# Patient Record
Sex: MALE | Race: WHITE | HISPANIC OR LATINO | Employment: OTHER | URBAN - METROPOLITAN AREA
[De-identification: names, ages, dates, MRNs, and addresses within clinical notes are randomized per-mention and may not be internally consistent; named-entity substitution may affect disease eponyms.]

---

## 2019-03-07 DIAGNOSIS — G95.9 CERVICAL MYELOPATHY WITH CERVICAL RADICULOPATHY: Primary | ICD-10-CM

## 2019-03-07 DIAGNOSIS — M54.12 CERVICAL MYELOPATHY WITH CERVICAL RADICULOPATHY: Primary | ICD-10-CM

## 2019-03-19 ENCOUNTER — CLINICAL SUPPORT (OUTPATIENT)
Dept: REHABILITATION | Facility: HOSPITAL | Age: 64
End: 2019-03-19
Payer: MEDICAID

## 2019-03-19 DIAGNOSIS — R29.898 DECREASED STRENGTH OF UPPER EXTREMITY: ICD-10-CM

## 2019-03-19 DIAGNOSIS — R29.898 DECREASED ROM OF NECK: ICD-10-CM

## 2019-03-19 DIAGNOSIS — R29.3 POOR POSTURE: ICD-10-CM

## 2019-03-19 PROCEDURE — 97162 PT EVAL MOD COMPLEX 30 MIN: CPT | Mod: PN

## 2019-03-19 NOTE — PLAN OF CARE
[facility]  Physical Therapy Initial Evaluation    Name: Rancho Contreras  Clinic Number: [id number]    Therapy Diagnosis:   Encounter Diagnoses   Name Primary?    Decreased ROM of neck     Decreased strength of upper extremity     Poor posture      Physician: Dong Ray    Physician Orders: PT Eval and Treat   Medical Diagnosis from Referral: M47.12 (ICD-10-CM) - Cervical myelopathy with cervical radiculopathy  Evaluation Date: 3/19/2019  Authorization Period Expiration: 3/31/19  Plan of Care Expiration: 5/17/19  Visit # / Visits authorized: 1/ 4    Time In: 3:10 pm  Time Out: 3:53 pm   Total Billable Time: 43 minutes    Precautions: Standard, Diabetes and HTN    Subjective   Date of onset: chronic; DOS 11/2/18  History of current condition - Rancho reports: that he had surgery where they went through the front of his neck to fix discs in upper back on November 2, 2018. Pt stated that that he has not had physical therapy since his surgery. Pt stated that he did do physical therapy prior to his surgery. Pt stated that he wore neck collar for a couple of months after his surgery, and has not worn neck collar for 1 1/2 months. Pt stated that he has not seen MD since December 2018 and that MD told him he needed to go to therapy.  Pt stated that he still experiences pain and weakness in (L) UE. Pt reported that he has difficulty holding/reaching objects in (L) UE due to weakness. Pt denies radiating pain into (R) UE. Pt denies numbness/tingling into (B) UE. Pt stated that he still does experience pain in his neck. Pt stated that he is (R) handed. Pt stated that he has been occasionally lifting his granddaughters who weigh approximately 40 lbs.      Medical History:   Past Medical History:   Diagnosis Date    Diabetes mellitus type II     HEARING LOSS     Hyperlipidemia     Hypertension        Surgical History:   Rancho Contreras  has a past surgical history that  includes Tonsillectomy and Adenoidectomy.    Medications:   Rancho has a current medication list which includes the following prescription(s): glyburide, lisinopril, metformin, and pravastatin.    Allergies:   Review of patient's allergies indicates:  No Known Allergies     Imaging: none in pt imaging section in pt chart review    Surgery(from MD surgery report): C3-4,C4-5, C5-6, C6-7 anterior cervical decompression and fusion using the [employer] XTEND anterior plating system with [place] PEEK interbody cages. [place] of left anterior superior iliac crest autograph through a separate incision.     Prior Therapy: yes - prior to surgery  Social History: Pt lives with their spouse  Occupation: Pt stated that he is on disability right now. 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   Prior Level of Function: independent  Current Level of Function: difficulty performing reaching tasks with (L) UE and uses (R) UE to help raise (L) UE    Pain:  Current 6/10, worst 6/10, best 6/10   Location: right neck radiating into (L) UE   Description: Aching and Sharp  Aggravating Factors: pt denies particular aggravating factors  Easing Factors: Tylenol    Pts goals: complete movement of the neck and fully use my left arm    Objective     Cervical AROM: Pain/Dysfunction with Movement:   Flexion 40 degrees C/o increased pain in neck   Extension 35 degrees    Right side bending 20 degrees C/o increased pain in (R) neck/UT region   Left side bending 20 degrees C/o pulling in (R) UT region   Right rotation 50 degrees C/o pain in (R) neck region   Left rotation 60 degrees Report of stretching in (R) neck region     Shoulder Right   Left  Pain/Dysfunction with Movement    AROM MMT AROM PROM MMT    flexion 160  5/5 158  NT 4+/5    abduction 160  5/5 115 160 3-/5    Internal rotation 80 5/5 80 NT 4/5    ER at 90° abd 90 NT 85 NT 4+/5 Report of pulling around supraspinatus with AROM   ER at 0° abd NT 5/5 NT NT NT       Elbow Right  Left  Pain/Dysfunction with Movement    AROM MMT AROM MMT    flexion WFL 5/5 WFL 4-/5    extension WFL 5/5 WFL 4/5       (#)    Right 53.3   Left 30     (B) UE grossly intact to light touch sensation  Pt c/o tenderness to palpation along (B) cervical paraspinal muscles, (B) UT, C2- C7 spinous processes, (R) thoracic paraspinals      CMS Impairment/Limitation/Restriction for FOTO neck Survey    Therapist reviewed FOTO scores for Rancho Contreras on 3/19/2019.   FOTO documents entered into [employer] - see Media section.    Limitation Score: 68%         TREATMENT       Home Exercises and Patient Education Provided     Education provided:   - Educated pt not to lift heavy objects and to avoid lifting his granddaughters. Pt verbalized understanding        Assessment   Rancho is a 63 y.o. male referred to outpatient Physical Therapy with a medical diagnosis of cervical myelopathy with cervical radiculopathy . Pt presents with decreased/painful cervical AROM, decreased (L) UE strength, decreased (L) shoulder AROM, poor posture, and decreased functional mobility. Pt will benefit from skilled PT to address the limitations listed above in order to return pt to his highest level of function with decreased pain and limitation.     Pt prognosis is Good.   Pt will benefit from skilled outpatient Physical Therapy to address the deficits stated above and in the chart below, provide pt/family education, and to maximize pt's level of independence.     Plan of care discussed with patient: Yes  Pt's spiritual, cultural and educational needs considered and patient is agreeable to the plan of care and goals as stated below:     Anticipated Barriers for therapy: comorbidities    Medical Necessity is demonstrated by the following  History  Co-morbidities and personal factors that may impact the plan of care Co-morbidities:   diabetes and HTN    Personal Factors:   no deficits     moderate   Examination  Body Structures  and Functions, activity limitations and participation restrictions that may impact the plan of care Body Regions:   neck  upper extremities    Body Systems:    ROM  strength    Participation Restrictions:   Not currently able to work    Activity limitations:   Learning and applying knowledge  no deficits    General Tasks and Commands  no deficits    Communication  no deficits    Mobility  lifting and carrying objects  fine hand use (grasping/picking up)  driving (bike, car, motorcycle)    Self care  no deficits    Domestic Life  doing house work (cleaning house, washing dishes, laundry)    Interactions/Relationships  no deficits    Life Areas  no deficits    Community and Social Life  no deficits         high   Clinical Presentation evolving clinical presentation with changing clinical characteristics moderate   Decision Making/ Complexity Score: moderate     Goals:  Short Term Goals (4 Weeks):  1. Pt will be independent with HEP to supplement PT in improving pain free cervical mobility  2. Pt will improve cervical AROM 10 deg in all planes to improve cervical mobility.  3. Pt will improve impaired UE MMTs by 1/2 grade in all planes to improve strength for functional tasks.  4. Pt will demonstrate improved sitting posture to decrease pain experienced in neck.    Long Term Goals (8 Weeks):   1. Pt will improve FOTO to </=50% limitation to improve perceived limitation with changing and maintaining mobility.  2. Pt will improve cervical AROM to WFL in all planes to improve cervical mobility.  3. Pt will improve impaired UE MMTs 1 grade in all planes to improve strength for functional tasks.  4. Pt will report pain </= 2/10 at worst to promote return to PLOF.  5. Pt will report pain </= 2/10 with cervical AROM in all planes to promote functional QOL.      Plan   Plan of care Certification: 3/19/2019 to 5/17/19.    Outpatient Physical Therapy 2 times weekly for 8 weeks to include the following interventions: Manual  Therapy, Moist Heat/ Ice, Neuromuscular Re-ed, Patient Education, Self Care, Therapeutic Activites, Therapeutic Exercise and ASTYM, FDN, and modalities prn.     Chasidy Arias, PT

## 2019-03-21 ENCOUNTER — CLINICAL SUPPORT (OUTPATIENT)
Dept: REHABILITATION | Facility: HOSPITAL | Age: 64
End: 2019-03-21
Payer: MEDICAID

## 2019-03-21 DIAGNOSIS — R29.3 POOR POSTURE: ICD-10-CM

## 2019-03-21 DIAGNOSIS — R29.898 DECREASED ROM OF NECK: ICD-10-CM

## 2019-03-21 DIAGNOSIS — R29.898 DECREASED STRENGTH OF UPPER EXTREMITY: ICD-10-CM

## 2019-03-21 PROCEDURE — 97110 THERAPEUTIC EXERCISES: CPT | Mod: PN

## 2019-03-21 NOTE — PROGRESS NOTES
"  Physical Therapy Daily Treatment Note     Name: Rancho Contreras  Clinic Number: [id number]    Therapy Diagnosis: No diagnosis found.  Physician: Dong Ray    Visit Date: 3/21/2019  Physician Orders: PT Eval and Treat   Medical Diagnosis from Referral: M47.12 (ICD-10-CM) - Cervical myelopathy with cervical radiculopathy  Evaluation Date: 3/19/2019  Authorization Period Expiration: 3/31/19  Plan of Care Expiration: 5/17/19  Visit # / Visits authorized: 2/ 4  FOTO: 2/5    Time In: 1300  Time Out: 1340  Total Billable Time: 40 minutes TEx3    Precautions: Standard, Diabetes and HTN    Subjective     Pt reports: His neck hurts and it is stiff, weakness left arm.  "Pain is always there"  He was not yet given HEP compliant with home exercise program.  Response to previous treatment: no adverse effects  Functional change: none    Pain: 6/10  Location: bilateral cervical area, left greater than right      Objective     Rancho received the following manual therapy techniques: Myofacial release and Soft tissue Mobilization were applied to the cervical spine and B upper traps x 15 minutes, including:    STM to B cervical paraspinals with elongation  Gentle sub occipital release  STM/MFR to B Upper Traps with manual stretching    Rancho received therapeutic exercises to develop strength, endurance, ROM, flexibility and posture for 25 minutes including:    Cervical retraction:   1x10  Cervical rotation: 1x10 B  Wand flexion  1# 2x10  Serratus push  1# wand 2x10  SL shldr abduction 1x10 B  SL shldr ER  1x10 B  Home Exercises Provided and Patient Education Provided     Education provided:   - Written initial HEP       Written Home Exercises Provided: yes.  Exercises were reviewed and Rancho was able to demonstrate them prior to the end of the session.  Rancho demonstrated good  understanding of the education provided.     See EMR under Media for exercises provided 3/21/2019.      Assessment     Patient required " "multiple verbal cues and tactile facilitation to perform therex with correct technique.  Difficulty with attempt at chin tucks, reports pain/limitations due to "plate in my neck" Held due to same.  Patient reports "looser" after treatment, but continued to rate pain at "6/10"    Rancho is progressing well towards his goals.   Pt prognosis is Good.     Pt will continue to benefit from skilled outpatient physical therapy to address the deficits listed in the problem list box on initial evaluation, provide pt/family education and to maximize pt's level of independence in the home and community environment.     Pt's spiritual, cultural and educational needs considered and pt agreeable to plan of care and goals.    Anticipated barriers to physical therapy: comorbidities       Goals:  Short Term Goals (4 Weeks):  1. Pt will be independent with HEP to supplement PT in improving pain free cervical mobility  2. Pt will improve cervical AROM 10 deg in all planes to improve cervical mobility.  3. Pt will improve impaired UE MMTs by 1/2 grade in all planes to improve strength for functional tasks.  4. Pt will demonstrate improved sitting posture to decrease pain experienced in neck.     Long Term Goals (8 Weeks):   1. Pt will improve FOTO to </=50% limitation to improve perceived limitation with changing and maintaining mobility.  2. Pt will improve cervical AROM to WFL in all planes to improve cervical mobility.  3. Pt will improve impaired UE MMTs 1 grade in all planes to improve strength for functional tasks.  4. Pt will report pain </= 2/10 at worst to promote return to PLOF.  5. Pt will report pain </= 2/10 with cervical AROM in all planes to promote functional QOL.      Plan     Continue PT per POC, progress as able.    Frida Kimbrough, PTA   "

## 2019-03-26 ENCOUNTER — CLINICAL SUPPORT (OUTPATIENT)
Dept: REHABILITATION | Facility: HOSPITAL | Age: 64
End: 2019-03-26
Payer: MEDICAID

## 2019-03-26 DIAGNOSIS — R29.3 POOR POSTURE: ICD-10-CM

## 2019-03-26 DIAGNOSIS — R29.898 DECREASED STRENGTH OF UPPER EXTREMITY: ICD-10-CM

## 2019-03-26 DIAGNOSIS — R29.898 DECREASED ROM OF NECK: ICD-10-CM

## 2019-03-26 PROCEDURE — 97110 THERAPEUTIC EXERCISES: CPT | Mod: PN

## 2019-03-26 NOTE — PROGRESS NOTES
"  Physical Therapy Daily Treatment Note     Name: Rancho Contreras  Clinic Number: [id number]    Therapy Diagnosis:   Encounter Diagnoses   Name Primary?    Decreased ROM of neck     Decreased strength of upper extremity     Poor posture      Physician: Dong Ray    Visit Date: 3/26/2019  Physician Orders: PT Eval and Treat   Medical Diagnosis from Referral: M47.12 (ICD-10-CM) - Cervical myelopathy with cervical radiculopathy  Evaluation Date: 3/19/2019  Authorization Period Expiration: 3/31/19  Plan of Care Expiration: 5/17/19  Visit # / Visits authorized: 3/ 4  FOTO: 3/5    Time In: 1400  Time Out: 1500  Total Billable Time:  minutes 60 TE    Precautions: Standard, Diabetes and HTN    Subjective     Pt reports: His neck hurts and it is stiff, weakness left arm.  "Pain is always there"  He was not yet given HEP compliant with home exercise program.  Response to previous treatment: no adverse effects  Functional change: none    Pain: 5/10  Location: bilateral cervical area, left greater than right      Objective     Rancho received the following manual therapy techniques: Myofacial release and Soft tissue Mobilization were applied to the cervical spine and B upper traps x 30 minutes, including:    STM to B cervical paraspinals with elongation  Gentle sub occipital release  STM/MFR to B Upper Traps with manual stretching  Deep tissue to B Upper trap/levator B  SCM CFM to insertion B  SCM muscle belly pull  [place] spring stretch      Rancho received therapeutic exercises to develop strength, endurance, ROM, flexibility and posture for 30 minutes including:    Cervical retraction:   1x10  Cervical rotation: 1x10 B  Wand flexion  1# 2x10  Serratus push  1# wand 2x10  SL shldr horiz abd       2x10 B w/scapular retraction/depression  SL shldr abduction 1x15 B w/scapular retraction/depression  SL shldr ER  2x10 B w/scap retraction/depression    Standing:   Elbow flexion               1x10  Rows             " "              2 x 10 YTB B  Elbow ext                     2 x 10 RTB  Home Exercises Provided and Patient Education Provided     Education provided:   - Written initial HEP       Written Home Exercises Provided: yes.  Exercises were reviewed and Rancho was able to demonstrate them prior to the end of the session.  Rancho demonstrated good  understanding of the education provided.     See EMR under Media for exercises provided 3/21/2019.      Assessment     Patient required multiple verbal cues and tactile facilitation to perform therex with correct technique.  Difficulty with attempt at chin tucks but tolerated better today, reports pain/limitations due to "plate in my neck". SL TE required extra time due to difficulty of task.   Rancho is progressing well towards his goals.   Pt prognosis is Good.     Pt will continue to benefit from skilled outpatient physical therapy to address the deficits listed in the problem list box on initial evaluation, provide pt/family education and to maximize pt's level of independence in the home and community environment.     Pt's spiritual, cultural and educational needs considered and pt agreeable to plan of care and goals.    Anticipated barriers to physical therapy: comorbidities       Goals:  Short Term Goals (4 Weeks):  1. Pt will be independent with HEP to supplement PT in improving pain free cervical mobility  2. Pt will improve cervical AROM 10 deg in all planes to improve cervical mobility.  3. Pt will improve impaired UE MMTs by 1/2 grade in all planes to improve strength for functional tasks.  4. Pt will demonstrate improved sitting posture to decrease pain experienced in neck.     Long Term Goals (8 Weeks):   1. Pt will improve FOTO to </=50% limitation to improve perceived limitation with changing and maintaining mobility.  2. Pt will improve cervical AROM to WFL in all planes to improve cervical mobility.  3. Pt will improve impaired UE MMTs 1 grade in all planes to " improve strength for functional tasks.  4. Pt will report pain </= 2/10 at worst to promote return to PLOF.  5. Pt will report pain </= 2/10 with cervical AROM in all planes to promote functional QOL.      Plan     Continue PT per POC, progress as able.    Lamont Trejo, PTA

## 2019-03-28 ENCOUNTER — CLINICAL SUPPORT (OUTPATIENT)
Dept: REHABILITATION | Facility: HOSPITAL | Age: 64
End: 2019-03-28
Payer: MEDICAID

## 2019-03-28 DIAGNOSIS — R29.3 POOR POSTURE: ICD-10-CM

## 2019-03-28 DIAGNOSIS — R29.898 DECREASED STRENGTH OF UPPER EXTREMITY: ICD-10-CM

## 2019-03-28 DIAGNOSIS — R29.898 DECREASED ROM OF NECK: ICD-10-CM

## 2019-03-28 PROCEDURE — 97140 MANUAL THERAPY 1/> REGIONS: CPT | Mod: PN

## 2019-03-28 PROCEDURE — 97110 THERAPEUTIC EXERCISES: CPT | Mod: PN

## 2019-03-28 NOTE — PROGRESS NOTES
"  Physical Therapy Daily Treatment Note     Name: Rancho Contreras  Clinic Number: [id number]    Therapy Diagnosis:   Encounter Diagnoses   Name Primary?    Decreased ROM of neck     Decreased strength of upper extremity     Poor posture      Physician: Dong Ray    Visit Date: 3/28/2019  Physician Orders: PT Eval and Treat   Medical Diagnosis from Referral: M47.12 (ICD-10-CM) - Cervical myelopathy with cervical radiculopathy  Evaluation Date: 3/19/2019  Authorization Period Expiration: 3/31/19  Plan of Care Expiration: 5/17/19  Visit # / Visits authorized: 4/ 4  FOTO: 4/10    Time In: 1400  Time Out: 1445  Total Billable Time:  30 minutes (MT-1, TE-1)    Precautions: Standard, Diabetes and HTN    Subjective     Pt reports: he pain is "getting a little better." States he is sore from performing his HEP  He was not yet given HEP compliant with home exercise program.  Response to previous treatment: no adverse effects  Functional change: none    Pain: 5/10  Location: bilateral cervical area, left greater than right      Objective     Rancho received the following manual therapy techniques: Myofacial release and Soft tissue Mobilization were applied to the cervical spine and B upper traps x 15 minutes, including:    STM to B cervical paraspinals with elongation  Gentle sub occipital release  STM/MFR to B Upper Trapezius with manual stretching        Rancho received therapeutic exercises to develop strength, endurance, ROM, flexibility and posture for 30 minutes including:    Cervical retraction:   20x5"  Cervical rotation: 2x10 5"  Wand flexion  1# 2x10  Serratus push  1# wand 2x10  SL shldr horiz abd       2x10 B w/scapular retraction/depression  SL shldr abduction 1x15 B w/scapular retraction/depression  SL shldr ER  2x10 B w/scap retraction/depression    Standing:   Elbow flexion               1x10  Rows                           2 x 10 YTB B  Elbow ext                     2 x 10 " RTB            Cervical AROM: Pain/Dysfunction with Movement:   Flexion 40 degrees C/o increased pain in neck   Extension 35 degrees     Right side bending 20 degrees C/o increased pain in (R) neck/UT region   Left side bending 20 degrees C/o pulling in (R) UT region   Right rotation 50 degrees C/o pain in (R) neck region   Left rotation 60 degrees Report of stretching in (R) neck region      Shoulder Right     Left   Pain/Dysfunction with Movement     AROM MMT AROM PROM MMT     flexion 160  5/5 158  NT 4+/5     abduction 160  5/5 115 160 3-/5     Internal rotation 80 5/5 80 NT 4/5     ER at 90° abd 90 NT 85 NT 4+/5 Report of pulling around supraspinatus with AROM   ER at 0° abd NT 5/5 NT NT NT        Elbow Right   Left   Pain/Dysfunction with Movement     AROM MMT AROM MMT     flexion WFL 5/5 WFL 4-/5     extension WFL 5/5 WFL 4/5         (#)     Right 53.3   Left 30      (B) UE grossly intact to light touch sensation  Pt c/o tenderness to palpation along (B) cervical paraspinal muscles, (B) UT, C2- C7 spinous processes, (R) thoracic paraspinals        Home Exercises Provided and Patient Education Provided     Education provided:   - be aware of posture      Written Home Exercises Provided: yes.  Exercises were reviewed and Rancho was able to demonstrate them prior to the end of the session.  Rancho demonstrated good  understanding of the education provided.     See EMR under Media for exercises provided 3/21/2019.      Assessment     Patient required verbal cuing for correct performance of exercises. Increased tissue tension in B cervical paraspinals, suboccipitals, and upper trapezius.    Rancho is progressing well towards his goals.   Pt prognosis is Good.     Pt will continue to benefit from skilled outpatient physical therapy to address the deficits listed in the problem list box on initial evaluation, provide pt/family education and to maximize pt's level of independence in the home and community  environment.     Pt's spiritual, cultural and educational needs considered and pt agreeable to plan of care and goals.    Anticipated barriers to physical therapy: comorbidities       Goals:  Short Term Goals (4 Weeks):  1. Pt will be independent with HEP to supplement PT in improving pain free cervical mobility PROGRESSING, NOT MET 3/28/2019  2. Pt will improve cervical AROM 10 deg in all planes to improve cervical mobility. PROGRESSING, NOT MET 3/28/2019  3. Pt will improve impaired UE MMTs by 1/2 grade in all planes to improve strength for functional tasks. PROGRESSING, NOT MET 3/28/2019  4. Pt will demonstrate improved sitting posture to decrease pain experienced in neck. PROGRESSING, NOT MET 3/28/2019     Long Term Goals (8 Weeks):   1. Pt will improve FOTO to </=50% limitation to improve perceived limitation with changing and maintaining mobility. PROGRESSING, NOT MET 3/28/2019  2. Pt will improve cervical AROM to WFL in all planes to improve cervical mobility. PROGRESSING, NOT MET 3/28/2019  3. Pt will improve impaired UE MMTs 1 grade in all planes to improve strength for functional tasks. PROGRESSING, NOT MET 3/28/2019  4. Pt will report pain </= 2/10 at worst to promote return to PLOF. PROGRESSING, NOT MET 3/28/2019  5. Pt will report pain </= 2/10 with cervical AROM in all planes to promote functional QOL. PROGRESSING, NOT MET 3/28/2019      Plan     Continue plan of care    Chantal Beard, PT

## 2019-04-01 ENCOUNTER — CLINICAL SUPPORT (OUTPATIENT)
Dept: REHABILITATION | Facility: HOSPITAL | Age: 64
End: 2019-04-01
Payer: MEDICAID

## 2019-04-01 DIAGNOSIS — R29.898 DECREASED ROM OF NECK: ICD-10-CM

## 2019-04-01 DIAGNOSIS — R29.3 POOR POSTURE: ICD-10-CM

## 2019-04-01 DIAGNOSIS — R29.898 DECREASED STRENGTH OF UPPER EXTREMITY: ICD-10-CM

## 2019-04-01 PROCEDURE — 97110 THERAPEUTIC EXERCISES: CPT | Mod: PN

## 2019-04-01 NOTE — PROGRESS NOTES
"  Physical Therapy Daily Treatment Note     Name: Rancho Contreras  Clinic Number: [id number]    Therapy Diagnosis:   Encounter Diagnoses   Name Primary?    Decreased ROM of neck     Decreased strength of upper extremity     Poor posture      Physician: Dong Ray    Visit Date: 4/1/2019      Physician Orders: PT Eval and Treat   Medical Diagnosis from Referral: M47.12 (ICD-10-CM) - Cervical myelopathy with cervical radiculopathy  Evaluation Date: 3/19/2019  Authorization Period Expiration: 3/31/19  Plan of Care Expiration: 5/17/19  Visit # / Visits authorized: 1/1  FOTO: 5/10 NEXT       Time In: 3:05 pm   Time Out: 3:58 pm   Total Billable Time: 53 minutes    Precautions: Standard, Diabetes and HTN    Subjective     Pt reports: that his neck is feeling a little better.   He was compliant with home exercise program.  Response to previous treatment: no adverse effects  Functional change: none    Pain: 5/10  Location: right neck and UT region       Objective     Rancho received the following manual therapy techniques: NOT PERFORMED THIS TREATMENT, resume next session      Rancho received therapeutic exercises to develop strength, endurance, ROM, flexibility and posture for 53 minutes including:    Chin tucks w/o retraction 5"x10   Cervical rotation: 2x10 5"  Wand flexion  1# 2x10 NOT PERFORMED THIS TREATMENT  Serratus push  1# wand 2x15  SL shldr horiz abd       2x10 B w/scapular retraction/depression  SL shldr abduction 2x10 B w/scapular retraction/depression  SL shldr ER  2x10 B w/scap retraction/depression  SL elbow flexion  2x10   Standing:   Elbow flexion               1x10 AAROM  Rows                           2 x 10 RTB B  Elbow ext                     2 x 15 RTB      Home Exercises Provided and Patient Education Provided     Education provided:  Pt verbalized understanding of all education provided   - Continue with HEP. Instructed to stop performing cervical retractions at home since pt c/o " increased pain when performing.   - Instructed pt to perform SL (L) elbow flexion.     Written Home Exercises Provided: Patient instructed to cont prior HEP.  Exercises were reviewed and Rancho was able to demonstrate them prior to the end of the session.  Rancho demonstrated good  understanding of the education provided.     See EMR under Media for exercises provided 3/21/19.      Assessment     Pt not able to perform (L) elbow flexion AROM against gravity. Pt reports not being able to flex (L) elbow since last May. Pt does have full (L) elbow flexion PROM against gravity. Pt is able to perform full (L) elbow flexion in gravity eliminated position ( 2+/5 MMT grade). Pt received VC/TC for proper technique when performing therex. Pt was able to tolerate therapy session without any adverse reactions. Pt reported no increased pain at the end of therapy session.   Rancho is progressing well towards his goals.   Pt prognosis is Good.     Pt will continue to benefit from skilled outpatient physical therapy to address the deficits listed in the problem list box on initial evaluation, provide pt/family education and to maximize pt's level of independence in the home and community environment.     Pt's spiritual, cultural and educational needs considered and pt agreeable to plan of care and goals.    Anticipated barriers to physical therapy: comorbidities     Goals:     Short Term Goals (4 Weeks):  1. Pt will be independent with HEP to supplement PT in improving pain free cervical mobility  2. Pt will improve cervical AROM 10 deg in all planes to improve cervical mobility.  3. Pt will improve impaired UE MMTs by 1/2 grade in all planes to improve strength for functional tasks.  4. Pt will demonstrate improved sitting posture to decrease pain experienced in neck.     Long Term Goals (8 Weeks):   1. Pt will improve FOTO to </=50% limitation to improve perceived limitation with changing and maintaining mobility.  2. Pt will  improve cervical AROM to WFL in all planes to improve cervical mobility.  3. Pt will improve impaired UE MMTs 1 grade in all planes to improve strength for functional tasks.  4. Pt will report pain </= 2/10 at worst to promote return to PLOF.  5. Pt will report pain </= 2/10 with cervical AROM in all planes to promote functional QOL.      Plan     Continue per POC, progress as tolerate, focus on improving (L) bicep strength     Chasidy Arias, PT

## 2019-04-03 ENCOUNTER — TELEPHONE (OUTPATIENT)
Dept: REHABILITATION | Facility: HOSPITAL | Age: 64
End: 2019-04-03

## 2019-04-08 ENCOUNTER — CLINICAL SUPPORT (OUTPATIENT)
Dept: REHABILITATION | Facility: HOSPITAL | Age: 64
End: 2019-04-08
Payer: MEDICAID

## 2019-04-08 DIAGNOSIS — R29.3 POOR POSTURE: ICD-10-CM

## 2019-04-08 DIAGNOSIS — R29.898 DECREASED STRENGTH OF UPPER EXTREMITY: ICD-10-CM

## 2019-04-08 DIAGNOSIS — R29.898 DECREASED ROM OF NECK: ICD-10-CM

## 2019-04-08 PROCEDURE — 97110 THERAPEUTIC EXERCISES: CPT | Mod: PN

## 2019-04-08 NOTE — PROGRESS NOTES
"  Physical Therapy Daily Treatment Note     Name: Rancho Contreras  Clinic Number: [id number]    Therapy Diagnosis:   Encounter Diagnoses   Name Primary?    Decreased ROM of neck     Decreased strength of upper extremity     Poor posture      Physician: Dong Ray    Visit Date: 4/8/2019    Physician Orders: PT Eval and Treat   Medical Diagnosis from Referral: M47.12 (ICD-10-CM) - Cervical myelopathy with cervical radiculopathy  Evaluation Date: 3/19/2019  Authorization Period Expiration: 3/31/2019  Plan of Care Expiration: 5/17/2019  Visit # / Visits authorized: 1/2  FOTO: 6/10 NEXT    Time In: 1506  Time Out: 1550  Total Billable Time: 44 minutes    Precautions: Standard, Diabetes and HTN    Subjective     Pt reports: continued difficulty with elbow flexion against gravity  He was compliant with home exercise program.  Response to previous treatment: slowly improving strength.  Functional change: slowly improving function    Pain: 5/10  Location: R neck and UT region       Objective     Rancho received the following manual therapy techniques: were applied to the: R cervicoscapular for 10 minutes, including:  Soft tissue mobilization and myofascial release to R upper trapezius and levator scapulae    Rancho received therapeutic exercises to develop strength, endurance, ROM, flexibility and posture for 34 minutes including:    Sidelying  Bicep curls: x30 L  Shoulder abduction: 1# x10 L w/scapular retraction/depression  Shoulder external rotation: 1# x10 L w/scapular retraction/depression  Shoulder flexion: 1# x10 L w/scapular retraction/depression    Seated:  Cervical rotation: x10 B to tolerance  Bicep curls: x8 L, 9th attempt failed due to fatigue  Scapular retraction/depression: x10  Breuggers: RTB x15    Standing:   Biceps isometric: 5"x10 L  Rows: Cables 7# 3x10  Shoulder extension: Cables 7# 2x10    Home Exercises Provided and Patient Education Provided     Education provided:   - Continue " HEP  - Educated patient on importance of patience for muscle strengthening as nerve regeneration following surgery to address chronic cervical myelopathy will take time.    Written Home Exercises Provided: No.  Exercises were reviewed and Rancho was able to demonstrate them prior to the end of the session.  Rancho demonstrated good  understanding of the education provided.     See EMR under Media for exercises provided 3/21/19.    Assessment     Able to perform elbow flexion AROM against gravity however fatigues quickly and compensates with scapula. General L UE shoulder weakness in comparison to L although range normal. Progression of weight on exercises improved spirits regarding status of strength; patient critical of self in terms of progress.    Rancho is progressing well towards his goals.   Pt prognosis is Good.   Pt will continue to benefit from skilled outpatient physical therapy to address the deficits listed in the problem list box on initial evaluation, provide pt/family education and to maximize pt's level of independence in the home and community environment.     Pt's spiritual, cultural and educational needs considered and pt agreeable to plan of care and goals.  Anticipated barriers to physical therapy: comorbidities     Goals:   Short Term Goals (4 Weeks):  1. Pt will be independent with HEP to supplement PT in improving pain free cervical mobility  2. Pt will improve cervical AROM 10 deg in all planes to improve cervical mobility.  3. Pt will improve impaired UE MMTs by 1/2 grade in all planes to improve strength for functional tasks.  4. Pt will demonstrate improved sitting posture to decrease pain experienced in neck.  Long Term Goals (8 Weeks):   1. Pt will improve FOTO to </=50% limitation to improve perceived limitation with changing and maintaining mobility.  2. Pt will improve cervical AROM to WFL in all planes to improve cervical mobility.  3. Pt will improve impaired UE MMTs 1 grade in all  planes to improve strength for functional tasks.  4. Pt will report pain </= 2/10 at worst to promote return to PLOF.  5. Pt will report pain </= 2/10 with cervical AROM in all planes to promote functional QOL.    Plan     Continue plan of care. Improve L bicep strength.     Mary Jacobs, PT

## 2019-04-10 ENCOUNTER — CLINICAL SUPPORT (OUTPATIENT)
Dept: REHABILITATION | Facility: HOSPITAL | Age: 64
End: 2019-04-10
Payer: MEDICAID

## 2019-04-10 DIAGNOSIS — R29.898 DECREASED STRENGTH OF UPPER EXTREMITY: ICD-10-CM

## 2019-04-10 DIAGNOSIS — R29.898 DECREASED ROM OF NECK: ICD-10-CM

## 2019-04-10 DIAGNOSIS — R29.3 POOR POSTURE: ICD-10-CM

## 2019-04-10 PROCEDURE — 97110 THERAPEUTIC EXERCISES: CPT | Mod: PN

## 2019-04-10 NOTE — PROGRESS NOTES
"  Physical Therapy Daily Treatment Note     Name: Rancho Contreras  Clinic Number: [id number]    Therapy Diagnosis:   Encounter Diagnoses   Name Primary?    Decreased ROM of neck     Decreased strength of upper extremity     Poor posture      Physician: Dong Ray    Visit Date: 4/10/2019    Physician Orders: PT Eval and Treat   Medical Diagnosis from Referral: M47.12 (ICD-10-CM) - Cervical myelopathy with cervical radiculopathy  Evaluation Date: 3/19/2019  Authorization Period Expiration: 3/31/2019  Plan of Care Expiration: 5/17/2019  Visit # / Visits authorized: 2/2  FOTO: 7/10   PTA visit: 1/6     Time In: 1400  Time Out: 1500  Total Billable Time: 45 minutes (3 TE)     Precautions: Standard, Diabetes and HTN         Subjective     Pt reports: he feels as though he is improving with therapy.  Still has pain on right side of neck   He was compliant with home exercise program.  Response to previous treatment: no adverse reaction  Functional change: none    Pain: 4/10  Location: right neck      Objective     Rancho received the following manual therapy techniques: Myofacial release and Soft tissue Mobilization were applied to the: B upper trap and levator scap for 10 minutes, including:    Soft tissue mobilization and myofascial release to R upper trapezius and levator scapulae    Rancho received therapeutic exercises to develop strength and ROM for 50 minutes including:    Sidelying  Bicep curls: x30 L  Shoulder abduction: 1# 2x10 L w/scapular retraction/depression  Shoulder external rotation: 1# 2x10 L w/scapular retraction/depression  Shoulder flexion: 1# 2x10 L w/scapular retraction/depression     Seated:  Cervical rotation: x10 B to tolerance  Bicep curls: x4 w/1# L, 5th attempt failed due to fatigue  Scapular retraction/depression: x10  Breuggers: RTB x15     Standing:   Biceps isometric: 5"x10 L NOT PERFORMED D THIS TREATMENT  Rows: Cables 7# 3x10  Shoulder extension: Cables 7# 2x10    Home " Exercises Provided and Patient Education Provided     Education provided:   - cont HEP regularly    Written Home Exercises Provided: Patient instructed to cont prior HEP.  Exercises were reviewed and Rancho was able to demonstrate them prior to the end of the session.  Rancho demonstrated good  understanding of the education provided.     See EMR under Patient Instructions for exercises provided 3/19/19.      Assessment     Pt tolerates therapy activities without difficulties or c/o increased pain.  Just with c/o fatigue in LUE  Rancho is progressing well towards his goals.   Pt prognosis is Good.     Pt will continue to benefit from skilled outpatient physical therapy to address the deficits listed in the problem list box on initial evaluation, provide pt/family education and to maximize pt's level of independence in the home and community environment.     Pt's spiritual, cultural and educational needs considered and pt agreeable to plan of care and goals.    Anticipated barriers to physical therapy: comorbidities    Goals:   Short Term Goals (4 Weeks):  1. Pt will be independent with HEP to supplement PT in improving pain free cervical mobility  2. Pt will improve cervical AROM 10 deg in all planes to improve cervical mobility.  3. Pt will improve impaired UE MMTs by 1/2 grade in all planes to improve strength for functional tasks.  4. Pt will demonstrate improved sitting posture to decrease pain experienced in neck.  Long Term Goals (8 Weeks):   1. Pt will improve FOTO to </=50% limitation to improve perceived limitation with changing and maintaining mobility.  2. Pt will improve cervical AROM to WFL in all planes to improve cervical mobility.  3. Pt will improve impaired UE MMTs 1 grade in all planes to improve strength for functional tasks.  4. Pt will report pain </= 2/10 at worst to promote return to PLOF.  5. Pt will report pain </= 2/10 with cervical AROM in all planes to promote functional QOL.      Plan      Cont POC to progress towards established goals    Chaya Rodriguez, PTA

## 2019-04-15 ENCOUNTER — CLINICAL SUPPORT (OUTPATIENT)
Dept: REHABILITATION | Facility: HOSPITAL | Age: 64
End: 2019-04-15
Payer: MEDICAID

## 2019-04-15 DIAGNOSIS — R29.898 DECREASED ROM OF NECK: ICD-10-CM

## 2019-04-15 DIAGNOSIS — R29.3 POOR POSTURE: ICD-10-CM

## 2019-04-15 DIAGNOSIS — R29.898 DECREASED STRENGTH OF UPPER EXTREMITY: ICD-10-CM

## 2019-04-15 PROCEDURE — 97110 THERAPEUTIC EXERCISES: CPT | Mod: PN

## 2019-04-15 NOTE — PROGRESS NOTES
"  Physical Therapy Daily Treatment Note     Name: Rancho Contreras  Clinic Number: [id number]    Therapy Diagnosis:   Encounter Diagnoses   Name Primary?    Decreased ROM of neck     Decreased strength of upper extremity     Poor posture      Physician: Dong Ray    Visit Date: 4/15/2019    Physician Orders: PT Eval and Treat   Medical Diagnosis from Referral: M47.12 (ICD-10-CM) - Cervical myelopathy with cervical radiculopathy  Evaluation Date: 3/19/2019  Authorization Period Expiration: 3/31/2019  Plan of Care Expiration: 5/17/2019  Visit # / Visits authorized: 2/2  FOTO: 8/10       Time In: 3:07 pm   Time Out: 3:58 pm   Total Billable Time: 51 minutes    Precautions: Standard, Diabetes and HTN    Subjective     Pt reports: that his (L) UE feels like it is getting stronger. Pt stated that (R) side of his neck was achy/stiff today.   He was compliant with home exercise program.  Response to previous treatment: no adverse effects  Functional change: none    Pain: 4/10  Location: right neck      Objective     Rancho received the following manual therapy techniques: Myofacial release and Soft tissue Mobilization were applied to the: B upper trap and levator scap for 10 minutes, including:    Soft tissue mobilization and myofascial release to R upper trapezius and levator scapulae    Objective measurements were taken and Rancho received therapeutic exercises to develop strength and ROM for 41 minutes including:    Sidelying    Shoulder abduction: 1# 2x10 L w/scapular retraction/depression  Shoulder external rotation: 1# 2x10 L w/scapular retraction/depression  Shoulder flexion: 1# 2x10 L w/scapular retraction/depression     Seated:  Cervical rotation: x10 B to tolerance  Bicep curls: x10 seated  Scapular retraction/depression: x10  Breuggers: RTB x15     Standing:   Biceps isometric: 5"x10 L NOT PERFORMED THIS TREATMENT  Rows: Cables 7# 2x10  Shoulder extension: Cables 7# 2x10        Cervical AROM: " "Pain/Dysfunction with Movement:   Flexion 50 degrees "stiff"    Extension 40 degrees  "soreness in back of neck"    Right side bending 25 degrees  C/o increased pain in (R) neck/UT region   Left side bending 25 degrees C/o pulling in (R) UT region   Right rotation 65 degrees C/o pain in (R) neck region   Left rotation 65 degrees Report of stretching in (R) neck region      Shoulder Right     Left   Pain/Dysfunction with Movement     AROM MMT AROM PROM MMT  != pain   flexion 160  5/5 159 NT 4+/5     abduction 160  5/5 160 NT 4/5     Internal rotation 80 5/5 80 NT 4/5     ER at 90° abd 90 NT 78! NT 4+/5 Report of pain around supraspinatus    ER at 0° abd NT 5/5 NT NT 4+/5        Elbow Right   Left   Pain/Dysfunction with Movement     AROM MMT AROM MMT     flexion WFL 5/5 WFL 3+/5     extension WFL 5/5 WFL 4+/5         (#)     Right 51.6   Left 43.3           Home Exercises Provided and Patient Education Provided     Education provided:   - Continue with HEP. Instructed pt to perform seated elbow flexion AROM 10x 3x/day. Pt verbalized understanding.     Written Home Exercises Provided: Patient instructed to cont prior HEP.  Exercises were reviewed and Rancho was able to demonstrate them prior to the end of the session.  Rancho demonstrated good  understanding of the education provided.     See EMR under Patient Instructions for exercises provided 3/21/19.       Assessment     Pt demo improved (L)  strength, improved (L) shoulder abduction AROM, and improved cervical AROM compared to measurements taken on initial evaluation. At visit on 4/1/19, pt was not able to perform active (L) elbow flexion against gravity, but was able to perform full (L) elbow flexion AROM in gravity eliminate position (2+/5 MMT). Today and during past couple of sessions pt was able to perform (L) elbow flexion full AROM against gravity. Pt is making steady progress with his PT treatments. Despite these improvements, pt still presents with " (L) UE weakness, decreased/painful cervical AROM, c/o neck pain, decreased postural awareness, and decreased functional mobility. Therefore, pt will continue to benefit from skilled PT to address the limitations listed above in order to return pt to his highest level of function with decreased pain and limitation. Additional set of row therex held today 2/2 to pt fatigue. (L) UE is quick to fatigue. Pt was able to tolerate all therex without c/o increased pain.     Rancho is progressing well towards his goals.   Pt prognosis is Good.     Pt will continue to benefit from skilled outpatient physical therapy to address the deficits listed in the problem list box on initial evaluation, provide pt/family education and to maximize pt's level of independence in the home and community environment.     Pt's spiritual, cultural and educational needs considered and pt agreeable to plan of care and goals.    Anticipated barriers to physical therapy: comorbidities    Goals:     Short Term Goals (4 Weeks):  1. Pt will be independent with HEP to supplement PT in improving pain free cervical mobility (Met)   2. Pt will improve cervical AROM 10 deg in all planes to improve cervical mobility. ( Progressing not met)   3. Pt will improve impaired UE MMTs by 1/2 grade in all planes to improve strength for functional tasks. (Progressing not met)   4. Pt will demonstrate improved sitting posture to decrease pain experienced in neck. - (progressin not met)   Long Term Goals (8 Weeks):   1. Pt will improve FOTO to </=50% limitation to improve perceived limitation with changing and maintaining mobility. (progressing not met)   2. Pt will improve cervical AROM to WFL in all planes to improve cervical mobility. ( progressing not met)   3. Pt will improve impaired UE MMTs 1 grade in all planes to improve strength for functional tasks. (progressing not met)   4. Pt will report pain </= 2/10 at worst to promote return to PLOF. ( progressing not  met)   5. Pt will report pain </= 2/10 with cervical AROM in all planes to promote functional QOL. (progressing not met)     Plan     Continue per POC, progress as tolerated    Chasidy Arias, PT

## 2019-04-17 ENCOUNTER — CLINICAL SUPPORT (OUTPATIENT)
Dept: REHABILITATION | Facility: HOSPITAL | Age: 64
End: 2019-04-17
Payer: MEDICAID

## 2019-04-17 DIAGNOSIS — R29.3 POOR POSTURE: ICD-10-CM

## 2019-04-17 DIAGNOSIS — R29.898 DECREASED ROM OF NECK: ICD-10-CM

## 2019-04-17 DIAGNOSIS — R29.898 DECREASED STRENGTH OF UPPER EXTREMITY: ICD-10-CM

## 2019-04-17 PROCEDURE — 97110 THERAPEUTIC EXERCISES: CPT | Mod: PN

## 2019-04-17 NOTE — PROGRESS NOTES
"  Physical Therapy Daily Treatment Note     Name: Rancho Contreras  Clinic Number: [id number]    Therapy Diagnosis:   Encounter Diagnoses   Name Primary?    Decreased ROM of neck     Decreased strength of upper extremity     Poor posture      Physician: Dong Ray    Visit Date: 4/17/2019    Physician Orders: PT Eval and Treat   Medical Diagnosis from Referral: M47.12 (ICD-10-CM) - Cervical myelopathy with cervical radiculopathy  Evaluation Date: 3/19/2019  Authorization Period Expiration: 3/31/2019  Plan of Care Expiration: 5/17/2019  Visit # / Visits authorized: 1/5  FOTO: 9/10     Time In: 2:04 pm   Time Out: 2:53 pm   Total Billable Time: 49 minutes    Precautions: Standard, Diabetes and HTN    Subjective     Pt reports: that he experiencing mild pain in (R) side of his neck today.   He was compliant with home exercise program.  Response to previous treatment: no adverse effects  Functional change: none    Pain: 3/10  Location: right neck/UT region       Objective     Rancho received the following manual therapy techniques: Soft tissue Mobilization were applied to the: B upper trap and levator scap for 10 minutes, including:    Soft tissue mobilization to R upper trapezius and levator scapulae    Objective measurements were taken and Rancho received therapeutic exercises to develop strength and ROM for 39 minutes including:    Sidelying    Shoulder abduction: 1# 2x15 B w/scapular retraction/depression  Shoulder external rotation: 1# 2x15 B w/scapular retraction/depression  Shoulder flexion: 1# 2x15 B w/scapular retraction/depression     Seated:  Cervical rotation: x10 B to tolerance NOT PERFORMED THIS TREATMENT  Bicep curls: 2x10 seated  Breuggers: RTB x15     Standing:   Biceps isometric: 5"x10 L NOT PERFORMED THIS TREATMENT  Rows: Cables 7# 2x10  Shoulder extension: Cables 7# 2x10      Home Exercises Provided and Patient Education Provided     Education provided:   - Continue with HEP "     Written Home Exercises Provided: Patient instructed to cont prior HEP.  Exercises were reviewed and Rancho was able to demonstrate them prior to the end of the session.  Rancho demonstrated good  understanding of the education provided.     See EMR under Patient Instructions for exercises provided 3/21/19.      Assessment     Pt was able to tolerate increased reps noted above. Pt did require a rest break in between sets of (L) bicep curls in order to be able to complete 2nd set. Pt tolerated all therex noted above without c/o increased pain.   Rancho is progressing well towards his goals.   Pt prognosis is Good.     Pt will continue to benefit from skilled outpatient physical therapy to address the deficits listed in the problem list box on initial evaluation, provide pt/family education and to maximize pt's level of independence in the home and community environment.     Pt's spiritual, cultural and educational needs considered and pt agreeable to plan of care and goals.    Anticipated barriers to physical therapy: comorbidities    Goals:     Short Term Goals (4 Weeks):  1. Pt will be independent with HEP to supplement PT in improving pain free cervical mobility (Met)   2. Pt will improve cervical AROM 10 deg in all planes to improve cervical mobility. ( Progressing not met)   3. Pt will improve impaired UE MMTs by 1/2 grade in all planes to improve strength for functional tasks. (Progressing not met)   4. Pt will demonstrate improved sitting posture to decrease pain experienced in neck. - (progressin not met)   Long Term Goals (8 Weeks):   1. Pt will improve FOTO to </=50% limitation to improve perceived limitation with changing and maintaining mobility. (progressing not met)   2. Pt will improve cervical AROM to WFL in all planes to improve cervical mobility. ( progressing not met)   3. Pt will improve impaired UE MMTs 1 grade in all planes to improve strength for functional tasks. (progressing not met)   4. Pt  will report pain </= 2/10 at worst to promote return to PLOF. ( progressing not met)   5. Pt will report pain </= 2/10 with cervical AROM in all planes to promote functional QOL. (progressing not met)       Plan     Continue per POC, progress as tolerated    Chasidy Arias, PT

## 2019-04-22 ENCOUNTER — CLINICAL SUPPORT (OUTPATIENT)
Dept: REHABILITATION | Facility: HOSPITAL | Age: 64
End: 2019-04-22
Payer: MEDICAID

## 2019-04-22 DIAGNOSIS — R29.3 POOR POSTURE: ICD-10-CM

## 2019-04-22 DIAGNOSIS — R29.898 DECREASED STRENGTH OF UPPER EXTREMITY: ICD-10-CM

## 2019-04-22 DIAGNOSIS — R29.898 DECREASED ROM OF NECK: ICD-10-CM

## 2019-04-22 PROCEDURE — 97110 THERAPEUTIC EXERCISES: CPT | Mod: PN

## 2019-04-22 NOTE — PROGRESS NOTES
"  Physical Therapy Daily Treatment Note     Name: Rancho Contreras  Clinic Number: [id number]    Therapy Diagnosis:   Encounter Diagnoses   Name Primary?    Decreased ROM of neck     Decreased strength of upper extremity     Poor posture      Physician: Dong Ray    Visit Date: 4/22/2019    Physician Orders: PT Eval and Treat   Medical Diagnosis from Referral: M47.12 (ICD-10-CM) - Cervical myelopathy with cervical radiculopathy  Evaluation Date: 3/19/2019  Authorization Period Expiration: 3/31/2019  Plan of Care Expiration: 5/17/2019  Visit # / Visits authorized: 2/5  FOTO: 10/10 NEXT      Time In: 3:15 pm   Time Out: 4:00 pm   Total Billable Time: 30 minutes    Precautions: Standard, Diabetes and HTN    Subjective     Pt reports: that in (R) side of his neck he is experiencing more stiffness than pain.   He was compliant with home exercise program.  Response to previous treatment: no adverse effects  Functional change: none    Pain: 3/10  Location: right neck/UT      Objective     Rancho received the following manual therapy techniques: Soft tissue Mobilization were applied to the: B upper trap and levator scap for 10 minutes, including:    Soft tissue mobilization to R upper trapezius and levator scapulae    Rancho received therapeutic exercises to develop strength and ROM for 15 minutes supervised and x 20' 1:1 with PT including:    Sidelying    Shoulder abduction: 1# 2x15 B w/scapular retraction/depression  Shoulder external rotation: 1# 2x15 B w/scapular retraction/depression  Shoulder flexion: 1# 2x15 B w/scapular retraction/depression     Seated:  Cervical rotation: x10 B to tolerance NOT PERFORMED THIS TREATMENT  Bicep curls: 5# 2x10 R; 1# 1x10 seated palm up L;  x5 thumb up bicep curl L   Eccentric bicep lowering 1x10   Breuggers: RTB x15     Standing:   Biceps isometric: 5"x10 L NOT PERFORMED THIS TREATMENT  Rows: Cables 7# 2x15  Shoulder extension: Cables 7# 2x15  Home Exercises Provided " and Patient Education Provided     Education provided:   - Continue with HEP    Written Home Exercises Provided: Patient instructed to cont prior HEP.  Exercises were reviewed and Rancho was able to demonstrate them prior to the end of the session.  Rancho demonstrated good  understanding of the education provided.     See EMR under Patient Instructions for exercises provided 3/21/19.      Assessment     Pt is quick to fatigue with (L) UE bicep strengthening therex. Attempted to perform biceps curls 3 ways on (L) UE, but not able to perform with palm facing down and only able to perform 5 reps with thumb facing up. Pt tolerated therapy session without c/o increased pain or adverse reactions.   Rancho is progressing well towards his goals.   Pt prognosis is Good.     Pt will continue to benefit from skilled outpatient physical therapy to address the deficits listed in the problem list box on initial evaluation, provide pt/family education and to maximize pt's level of independence in the home and community environment.     Pt's spiritual, cultural and educational needs considered and pt agreeable to plan of care and goals.    Anticipated barriers to physical therapy: comorbidities     Goals:     Short Term Goals (4 Weeks):  1. Pt will be independent with HEP to supplement PT in improving pain free cervical mobility (Met)   2. Pt will improve cervical AROM 10 deg in all planes to improve cervical mobility. ( Progressing not met)   3. Pt will improve impaired UE MMTs by 1/2 grade in all planes to improve strength for functional tasks. (Progressing not met)   4. Pt will demonstrate improved sitting posture to decrease pain experienced in neck. - (progressin not met)   Long Term Goals (8 Weeks):   1. Pt will improve FOTO to </=50% limitation to improve perceived limitation with changing and maintaining mobility. (progressing not met)   2. Pt will improve cervical AROM to WFL in all planes to improve cervical mobility. (  progressing not met)   3. Pt will improve impaired UE MMTs 1 grade in all planes to improve strength for functional tasks. (progressing not met)   4. Pt will report pain </= 2/10 at worst to promote return to PLOF. ( progressing not met)   5. Pt will report pain </= 2/10 with cervical AROM in all planes to promote functional QOL. (progressing not met)       Plan     Continue per POC, progress as tolerated, continue to focus on (L) UE strengthening with focus on (L) bicep    Chasidy Arias, PT

## 2019-04-23 ENCOUNTER — DOCUMENTATION ONLY (OUTPATIENT)
Dept: REHABILITATION | Facility: HOSPITAL | Age: 64
End: 2019-04-23

## 2019-04-23 NOTE — PROGRESS NOTES
Face to Face PTA Conference performed with Frida Kimbrough PTA, Lamont Trejo PTA, and Chaya Rodriguez PTA regarding patient's current status, overall progress, and plan of care.     Chasidy Arias, PT     Chaya Rodriguez, PTA     Lamont Trejo, PTA     Frida Kimbrough, PTA

## 2019-04-24 ENCOUNTER — CLINICAL SUPPORT (OUTPATIENT)
Dept: REHABILITATION | Facility: HOSPITAL | Age: 64
End: 2019-04-24
Payer: MEDICAID

## 2019-04-24 DIAGNOSIS — R29.3 POOR POSTURE: ICD-10-CM

## 2019-04-24 DIAGNOSIS — R29.898 DECREASED STRENGTH OF UPPER EXTREMITY: ICD-10-CM

## 2019-04-24 DIAGNOSIS — R29.898 DECREASED ROM OF NECK: ICD-10-CM

## 2019-04-24 PROCEDURE — 97110 THERAPEUTIC EXERCISES: CPT | Mod: PN

## 2019-04-24 NOTE — PROGRESS NOTES
"  Physical Therapy Daily Treatment Note     Name: Rancho Contreras  Clinic Number: [id number]    Therapy Diagnosis:   Encounter Diagnoses   Name Primary?    Decreased ROM of neck     Decreased strength of upper extremity     Poor posture      Physician: Dong Ray    Visit Date: 4/24/2019    Physician Orders: PT Eval and Treat   Medical Diagnosis from Referral: M47.12 (ICD-10-CM) - Cervical myelopathy with cervical radiculopathy  Evaluation Date: 3/19/2019  Authorization Period Expiration: 3/31/2019  Plan of Care Expiration: 5/17/2019  Visit # / Visits authorized: 3/5  FOTO: 11     Time In: 2:07 pm   Time Out: 2:56 pm   Total Billable Time: 26 minutes    Precautions: Standard, Diabetes and HTN    Subjective     Pt reports: that he was experiencing a little bit of neck pain.  He was compliant with home exercise program.  Response to previous treatment: no adverse effects  Functional change: none    Pain: 3/10  Location: right neck/UT      Objective     Rancho received the following manual therapy techniques: Soft tissue Mobilization were applied to the: B upper trap and levator scap for 9 minutes, including:    Soft tissue mobilization to R upper trapezius, levator scapulae, cervical paraspinals    Rancho received therapeutic exercises to develop strength and ROM for 23 minutes supervised and x 17' 1:1 with PT including:    Sidelying    Shoulder abduction: 1# 2x15 B w/scapular retraction/depression  Shoulder external rotation: 1# 2x15 B w/scapular retraction/depression  Shoulder flexion: 1# 2x15 B w/scapular retraction/depression     Seated:  Cervical rotation: x10 B to tolerance NOT PERFORMED THIS TREATMENT  Bicep curls: 5# 2x10 R; 1# 2x10 seated palm up L;  1x10 thumb up L; 1x10 palm down L   Eccentric bicep lowering 1x10   Breuggers: RTB x15     Standing:   Biceps isometric: 5"x10 L NOT PERFORMED THIS TREATMENT  Rows: Cables 7# 2x15  Shoulder extension: Cables 7# 2x15  Corner pec stretch 1x30" then " held  Home Exercises Provided and Patient Education Provided     Education provided:   - Continue with HEP     Written Home Exercises Provided: Patient instructed to cont prior HEP.  Exercises were reviewed and Rancho was able to demonstrate them prior to the end of the session.  Rancho demonstrated good  understanding of the education provided.     See EMR under Patient Instructions for exercises provided 3/21/19.      Assessment     Pt was able to perform one set of (L) biceps curls with palm facing down today. Pt attempted to perform corner pec stretch but c/o mild increased pain in posterior (L) shoulder so additional reps were held. Pt was able to tolerate all other therex without c/o increased pain and no c/o increased pain at end of therapy session.   Rancho is progressing well towards his goals.   Pt prognosis is Good.     Pt will continue to benefit from skilled outpatient physical therapy to address the deficits listed in the problem list box on initial evaluation, provide pt/family education and to maximize pt's level of independence in the home and community environment.     Pt's spiritual, cultural and educational needs considered and pt agreeable to plan of care and goals.    Anticipated barriers to physical therapy: comorbidities    Goals:     Short Term Goals (4 Weeks):  1. Pt will be independent with HEP to supplement PT in improving pain free cervical mobility (Met)   2. Pt will improve cervical AROM 10 deg in all planes to improve cervical mobility. ( Progressing not met)   3. Pt will improve impaired UE MMTs by 1/2 grade in all planes to improve strength for functional tasks. (Progressing not met)   4. Pt will demonstrate improved sitting posture to decrease pain experienced in neck. - (progressin not met)   Long Term Goals (8 Weeks):   1. Pt will improve FOTO to </=50% limitation to improve perceived limitation with changing and maintaining mobility. (progressing not met)   2. Pt will improve  cervical AROM to WFL in all planes to improve cervical mobility. ( progressing not met)   3. Pt will improve impaired UE MMTs 1 grade in all planes to improve strength for functional tasks. (progressing not met)   4. Pt will report pain </= 2/10 at worst to promote return to PLOF. ( progressing not met)   5. Pt will report pain </= 2/10 with cervical AROM in all planes to promote functional QOL. (progressing not met)     Plan   Continue per POC, progress as tolerated, continue to focus on (L) UE strengthening with focus on (L) bicep    Chasidy Arias, PT

## 2019-04-29 ENCOUNTER — CLINICAL SUPPORT (OUTPATIENT)
Dept: REHABILITATION | Facility: HOSPITAL | Age: 64
End: 2019-04-29
Payer: MEDICAID

## 2019-04-29 DIAGNOSIS — R29.898 DECREASED ROM OF NECK: ICD-10-CM

## 2019-04-29 DIAGNOSIS — R29.3 POOR POSTURE: ICD-10-CM

## 2019-04-29 DIAGNOSIS — R29.898 DECREASED STRENGTH OF UPPER EXTREMITY: ICD-10-CM

## 2019-04-29 PROCEDURE — 97110 THERAPEUTIC EXERCISES: CPT | Mod: PN

## 2019-04-29 NOTE — PROGRESS NOTES
"  Physical Therapy Daily Treatment Note     Name: Rancho Contreras  Clinic Number: [id number]    Therapy Diagnosis:   Encounter Diagnoses   Name Primary?    Decreased ROM of neck     Decreased strength of upper extremity     Poor posture      Physician: Dong Ray    Visit Date: 4/29/2019    Physician Orders: PT Eval and Treat   Medical Diagnosis from Referral: M47.12 (ICD-10-CM) - Cervical myelopathy with cervical radiculopathy  Evaluation Date: 3/19/2019  Authorization Period Expiration: 3/31/2019  Plan of Care Expiration: 5/17/2019  Visit # / Visits authorized: 4/5  FOTO: 12    Time In: 3:05 pm   Time Out: 3:54 pm   Total Billable Time: 35 minutes    Precautions: Standard, Diabetes and HTN    Subjective     Pt reports: that his neck has been feeling a little sore, but better. Pt stated that his (L) UE feels a little stronger, but stated that (L) UE still feels weak.   He was compliant with home exercise program.  Response to previous treatment: no adverse effects  Functional change: improved (L) shoulder AROM, improved (L) UE strength     Pain: 2/10  Location: right neck/UT      Objective     Rancho received therapeutic exercises to develop strength and ROM for 14 minutes supervised and x 35' 1:1 with PT including below and objective measurements    Sidelying    Shoulder abduction: 1# 2x15 B w/scapular retraction/depression  Shoulder external rotation: 1# 2x15 B w/scapular retraction/depression  Shoulder flexion: 1# 2x15 B w/scapular retraction/depression     Seated:  Cervical rotation: x10 B to tolerance NOT PERFORMED THIS TREATMENT  Bicep curls: 5# 2x10 R; 1# 2x10 seated palm up L;  1x10 thumb up L; 1x10 palm down L   Eccentric bicep lowering 1x10   Breuggers: GTB x10     Standing:   Biceps isometric: 5"x10 L NOT PERFORMED THIS TREATMENT  Rows: Cables 7# 2x10  Shoulder extension: Cables 7# 2x15  Corner pec stretch 1x30" then held NOT PERFORMED THIS TREATMENT  IR GTB R 1x10  ER RTB 1x10 " "B      Cervical AROM: Pain/Dysfunction with Movement:   Flexion 50 degrees "stiff"    Extension 45 degrees  "stiff"    Right side bending 25 degrees  C/o increased pain in (R) neck/UT region   Left side bending 25 degrees C/o pulling in (R) UT region   Right rotation 75 degrees C/o pain/stretching in (R) neck region   Left rotation 75 degrees Report of stretching in (R) neck region      Shoulder Right     Left   Pain/Dysfunction with Movement     AROM MMT AROM PROM MMT  != pain   flexion 160  5/5 163 NT 4+/5     abduction 170  5/5 170 NT 4/5     Internal rotation 80 5/5 80 NT 4+/5     ER at 90° abd 90 NT 80! NT NT Report of pain around supraspinatus    ER at 0° abd NT 5/5 NT NT 4+/5        Elbow Right   Left   Pain/Dysfunction with Movement     AROM MMT AROM MMT     flexion WFL 5/5 WFL 4-/5     extension WFL 5/5 WFL 4+/5         (#)     Right 61.6   Left 50       Home Exercises Provided and Patient Education Provided     Education provided:   - Continue with HEP     Written Home Exercises Provided: Patient instructed to cont prior HEP.  Exercises were reviewed and Rancho was able to demonstrate them prior to the end of the session.  Rancho demonstrated good  understanding of the education provided.     See EMR under Patient Instructions for exercises provided 3/21/19.      Assessment     Pt demo improved (L)  strength, improved (L) shoulder abduction, flexion, and ER AROM, and improved cervical extension and (B) cervical rotation AROM compared to measurements taken on 4/15/19. Pt also demo gradual improvement in (L) biceps strength. Pt is making steady progress with his PT treatments. Despite these improvements, pt still presents with (L) UE weakness, decreased/painful cervical AROM, c/o neck pain, decreased postural awareness, and decreased functional mobility. Therefore, pt will continue to benefit from skilled PT to address the limitations listed above in order to return pt to his highest level of function " with decreased pain and limitation. Pt was not able to perform (L) shoulder IR therex today 2/2 to not able to achieve proper degree of (L) elbow flexion 2/2 to muscular fatigue/weakness. Pt tolerated therapy session without c/o increased pain or adverse reactions.     Rancho is progressing well towards his goals.   Pt prognosis is Good.     Pt will continue to benefit from skilled outpatient physical therapy to address the deficits listed in the problem list box on initial evaluation, provide pt/family education and to maximize pt's level of independence in the home and community environment.     Pt's spiritual, cultural and educational needs considered and pt agreeable to plan of care and goals.    Anticipated barriers to physical therapy: comorbidities     Goals:     Short Term Goals (4 Weeks):  1. Pt will be independent with HEP to supplement PT in improving pain free cervical mobility (Met)   2. Pt will improve cervical AROM 10 deg in all planes to improve cervical mobility. ( Progressing not met)   3. Pt will improve impaired UE MMTs by 1/2 grade in all planes to improve strength for functional tasks. (Progressing not met)   4. Pt will demonstrate improved sitting posture to decrease pain experienced in neck. - (progressin not met)   Long Term Goals (8 Weeks):   1. Pt will improve FOTO to </=50% limitation to improve perceived limitation with changing and maintaining mobility. (progressing not met)   2. Pt will improve cervical AROM to WFL in all planes to improve cervical mobility. ( progressing not met)   3. Pt will improve impaired UE MMTs 1 grade in all planes to improve strength for functional tasks. (progressing not met)   4. Pt will report pain </= 2/10 at worst to promote return to PLOF. ( progressing not met)   5. Pt will report pain </= 2/10 with cervical AROM in all planes to promote functional QOL. (progressing not met)     Plan     Continue per POC, progress as tolerated    Chasidy Arias, PT

## 2019-05-01 ENCOUNTER — CLINICAL SUPPORT (OUTPATIENT)
Dept: REHABILITATION | Facility: HOSPITAL | Age: 64
End: 2019-05-01
Payer: MEDICAID

## 2019-05-01 DIAGNOSIS — R29.3 POOR POSTURE: ICD-10-CM

## 2019-05-01 DIAGNOSIS — R29.898 DECREASED STRENGTH OF UPPER EXTREMITY: ICD-10-CM

## 2019-05-01 DIAGNOSIS — R29.898 DECREASED ROM OF NECK: ICD-10-CM

## 2019-05-01 PROCEDURE — 97110 THERAPEUTIC EXERCISES: CPT | Mod: PN

## 2019-05-01 NOTE — PROGRESS NOTES
"  Physical Therapy Daily Treatment Note     Name: Rancho Contreras  Clinic Number: [id number]    Therapy Diagnosis:   Encounter Diagnoses   Name Primary?    Decreased ROM of neck     Decreased strength of upper extremity     Poor posture      Physician: Dong Ray    Visit Date: 5/1/2019    Physician Orders: PT Eval and Treat   Medical Diagnosis from Referral: M47.12 (ICD-10-CM) - Cervical myelopathy with cervical radiculopathy  Evaluation Date: 3/19/2019  Authorization Period Expiration: 3/31/2019  Plan of Care Expiration: 5/17/2019  Visit # / Visits authorized: 5/5  FOTO: 13    Time In: 1416  Time Out: 1458   Total Billable Time: 42 minutes    Precautions: Standard, Diabetes and HTN    Subjective     Pt reports: late arrival due to traffic  He was compliant with home exercise program.  Response to previous treatment: fatigue; decreased neck pain  Functional change: improved ability to perform bicep curl    Pain: 3/10  Location: R side of neck    Objective     Rancho received the following manual therapy techniques: were applied to the: R side of neck for 10 minutes, including:  Myofascial release to R upper trapezius and levator scapulae in L side lying    Rancho received therapeutic exercises to develop strength and ROM for 32 minutes including: below     Seated:  R upper trapezius stretch: 3x30"  R levator scapulae stretch: attempted 1x but terminated due to L neck pain  Box shoulder rolls: x20  L bicep curls: 1# x10 palm up; x10 thumb up L; x15 palm down   L eccentric bicep lowering: x5, active assistance for starting position; focus on control throughout range  Active shoulder abduction: x10 L  Active shoulder flexion: x10 L  Breuggers: GTB 2x10     Standing:   Rows: Cables 7# 2x15  Shoulder extension: Cables 7# 2x15  Internal rotation: Cables 3# x10 L; minimum assistant to maintain 90 degrees of elbow flexion  External rotation: Cables 3# x10 L; minimum assistant to maintain 90 degrees of elbow " flexion    Home Exercises Provided and Patient Education Provided     Education provided:   - Continue with HEP   - Neural contributions to strength and increased time to regain following nerve damage.      Written Home Exercises Provided: Patient instructed to cont prior HEP.  Exercises were reviewed and Rancho was able to demonstrate them prior to the end of the session.  Rancho demonstrated good  understanding of the education provided.     See EMR under Patient Instructions for exercises provided 3/21/19.    Assessment     Bicep curls improving slowly. Patient quick to fatigue during bicep curls; increased difficulty controlling lowering from 90-0 degrees of flexion, dropping arm 2/5 reps. Also quick to fatigue with overhead shoulder motion however less so than bicep curls.    Rancho is progressing well towards his goals.   Pt prognosis is Good.   Pt will continue to benefit from skilled outpatient physical therapy to address the deficits listed in the problem list box on initial evaluation, provide pt/family education and to maximize pt's level of independence in the home and community environment.     Pt's spiritual, cultural and educational needs considered and pt agreeable to plan of care and goals.  Anticipated barriers to physical therapy: comorbidities     Goals:   Short Term Goals (4 Weeks):  1. Pt will be independent with HEP to supplement PT in improving pain free cervical mobility MET  2. Pt will improve cervical AROM 10 deg in all planes to improve cervical mobility. PROGRESSING, NOT MET 5/1/2019   3. Pt will improve impaired UE MMTs by 1/2 grade in all planes to improve strength for functional tasks. PROGRESSING, NOT MET 5/1/2019   4. Pt will demonstrate improved sitting posture to decrease pain experienced in neck. PROGRESSING, NOT MET 5/1/2019   Long Term Goals (8 Weeks):   1. Pt will improve FOTO to </=50% limitation to improve perceived limitation with changing and maintaining mobility.  PROGRESSING, NOT MET 5/1/2019   2. Pt will improve cervical AROM to WFL in all planes to improve cervical mobility. PROGRESSING, NOT MET 5/1/2019   3. Pt will improve impaired UE MMTs 1 grade in all planes to improve strength for functional tasks. PROGRESSING, NOT MET 5/1/2019    4. Pt will report pain </= 2/10 at worst to promote return to PLOF. PROGRESSING, NOT MET 5/1/2019   5. Pt will report pain </= 2/10 with cervical AROM in all planes to promote functional QOL. PROGRESSING, NOT MET 5/1/2019     Plan     Continue plan of care. Gradual progression of L UE strength.     Mary Jacobs, PT

## 2019-05-06 ENCOUNTER — CLINICAL SUPPORT (OUTPATIENT)
Dept: REHABILITATION | Facility: HOSPITAL | Age: 64
End: 2019-05-06
Payer: MEDICAID

## 2019-05-06 DIAGNOSIS — R29.3 POOR POSTURE: ICD-10-CM

## 2019-05-06 DIAGNOSIS — R29.898 DECREASED ROM OF NECK: ICD-10-CM

## 2019-05-06 DIAGNOSIS — R29.898 DECREASED STRENGTH OF UPPER EXTREMITY: ICD-10-CM

## 2019-05-06 PROCEDURE — 97110 THERAPEUTIC EXERCISES: CPT | Mod: PN

## 2019-05-06 NOTE — PROGRESS NOTES
"  Physical Therapy Daily Treatment Note     Name: Rancho Contreras  Clinic Number: [id number]    Therapy Diagnosis:   Encounter Diagnoses   Name Primary?    Decreased ROM of neck     Decreased strength of upper extremity     Poor posture      Physician: Dong Ray    Visit Date: 5/6/2019    Physician Orders: PT Eval and Treat   Medical Diagnosis from Referral: M47.12 (ICD-10-CM) - Cervical myelopathy with cervical radiculopathy  Evaluation Date: 3/19/2019  Authorization Period Expiration: 3/31/2019  Plan of Care Expiration: 5/17/2019  Visit # / Visits authorized: 1/5 (15 total)  FOTO: 15    Time In: 1520  Time Out: 1605  Total Billable Time: 30 minutes (Therex- 2)    Precautions: Standard, Diabetes and HTN    Subjective     Pt reports: min neck pain .  He was compliant with home exercise program.  Response to previous treatment: no adverse reactions  Functional change: none reporte    Pain: 2/10  Location: bilateral shoulders     Patient arrived to appointment at the wrong time but able to accommodate.     Objective       Rancho received therapeutic exercises to develop strength and ROM for 45 minutes including: below     Seated:  R upper trapezius stretch: 3x30"  Box shoulder rolls: x20  L bicep curls: 1# x5, 0# x5 palm up; 0# x10 thumb up L;  0# x15 palm down   L eccentric bicep lowering: 3x5, active assistance for starting position; focus on control throughout range  Active shoulder abduction: 2x10 L  Active shoulder flexion: 2x10 L  Breuggers: GTB 3x10     Standing:   Rows: Cables 7# 2x15  Shoulder extension: Cables 7# 2x15  Internal rotation: Cables 3# x10 L; minimum assistant to maintain 90 degrees of elbow flexion  External rotation: Cables 3# x10 L; minimum assistant to maintain 90 degrees of elbow flexion   3/3    Home Exercises Provided and Patient Education Provided     Education provided:   - Continue with HEP .      Written Home Exercises Provided: Patient instructed to cont prior " HEP.  Exercises were reviewed and Rancho was able to demonstrate them prior to the end of the session.  Rancho demonstrated good  understanding of the education provided.     See EMR under Patient Instructions for exercises provided 3/21/19.    Assessment     Weight removed from bicep curls after 5 reps due to extreme fatigue due to considerable strength deficits. Moderate tactile cueing to maintain UE positioning during strengthening exercises     Rancho is progressing well towards his goals.   Pt prognosis is Good.   Pt will continue to benefit from skilled outpatient physical therapy to address the deficits listed in the problem list box on initial evaluation, provide pt/family education and to maximize pt's level of independence in the home and community environment.     Pt's spiritual, cultural and educational needs considered and pt agreeable to plan of care and goals.  Anticipated barriers to physical therapy: comorbidities     Goals:   Short Term Goals (4 Weeks):  1. Pt will be independent with HEP to supplement PT in improving pain free cervical mobility MET  2. Pt will improve cervical AROM 10 deg in all planes to improve cervical mobility. PROGRESSING, NOT MET 5/6/2019   3. Pt will improve impaired UE MMTs by 1/2 grade in all planes to improve strength for functional tasks. PROGRESSING, NOT MET 5/6/2019   4. Pt will demonstrate improved sitting posture to decrease pain experienced in neck. PROGRESSING, NOT MET 5/6/2019   Long Term Goals (8 Weeks):   1. Pt will improve FOTO to </=50% limitation to improve perceived limitation with changing and maintaining mobility. PROGRESSING, NOT MET 5/6/2019   2. Pt will improve cervical AROM to WFL in all planes to improve cervical mobility. PROGRESSING, NOT MET 5/6/2019   3. Pt will improve impaired UE MMTs 1 grade in all planes to improve strength for functional tasks. PROGRESSING, NOT MET 5/6/2019    4. Pt will report pain </= 2/10 at worst to promote return to PLOF.  PROGRESSING, NOT MET 5/6/2019   5. Pt will report pain </= 2/10 with cervical AROM in all planes to promote functional QOL. PROGRESSING, NOT MET 5/6/2019     Plan     Continue plan of care. Gradual progression of L UE strength.     Abundio Martinez Jr, PT

## 2019-05-08 ENCOUNTER — CLINICAL SUPPORT (OUTPATIENT)
Dept: REHABILITATION | Facility: HOSPITAL | Age: 64
End: 2019-05-08
Payer: MEDICAID

## 2019-05-08 DIAGNOSIS — R29.3 POOR POSTURE: ICD-10-CM

## 2019-05-08 DIAGNOSIS — R29.898 DECREASED ROM OF NECK: ICD-10-CM

## 2019-05-08 DIAGNOSIS — R29.898 DECREASED STRENGTH OF UPPER EXTREMITY: ICD-10-CM

## 2019-05-08 PROCEDURE — 97110 THERAPEUTIC EXERCISES: CPT | Mod: PN

## 2019-05-08 NOTE — PROGRESS NOTES
"  Physical Therapy Daily Treatment Note     Name: Rancho Contreras  Clinic Number: [id number]    Therapy Diagnosis:   Encounter Diagnoses   Name Primary?    Decreased ROM of neck     Decreased strength of upper extremity     Poor posture      Physician: Dong Ray    Visit Date: 5/8/2019    Physician Orders: PT Eval and Treat   Medical Diagnosis from Referral: M47.12 (ICD-10-CM) - Cervical myelopathy with cervical radiculopathy  Evaluation Date: 3/19/2019  Authorization Period Expiration: 3/31/2019  Plan of Care Expiration: 5/17/2019  Visit # / Visits authorized: 2/5 (16 total)  FOTO: 16    Time In: 1400  Time Out: 1445  Total Billable Time: 45 minutes (3TE)    Precautions: Standard, Diabetes and HTN    Subjective     Pt reports: mild stiffness in the neck.  He was compliant with home exercise program.  Response to previous treatment: no adverse reactions  Functional change: none reported    Pain: 2/10  Location: bilateral shoulders     Patient arrived to appointment at the wrong time but able to accommodate.     Objective     Rancho received therapeutic exercises to develop strength and ROM for 45 minutes including: below     Seated:  R upper trapezius stretch: 3x30"  Box shoulder rolls: x20  L bicep curls: 1# x5, 0# x5 palm up; 0# x10 thumb up L;  0# x15 palm down   L eccentric bicep lowering: 3x5, active assistance for starting position; focus on control throughout range  Active shoulder abduction: 2x10 L  Active shoulder flexion: 2x10 L  Breuggers: GTB 3x10     Standing:   Rows: Cables 7# 2x15  Shoulder extension: Cables 7# 2x15  Internal rotation: Cables 3# x10 L; minimum assistant to maintain 90 degrees of elbow flexion  External rotation: unable to    3/3    Home Exercises Provided and Patient Education Provided     Education provided:   - Continue with HEP .      Written Home Exercises Provided: Patient instructed to cont prior HEP.  Exercises were reviewed and Rancho was able to " demonstrate them prior to the end of the session.  Rancho demonstrated good  understanding of the education provided.     See EMR under Patient Instructions for exercises provided 3/21/19.    Assessment     Pt completed all TE except cable shoulder IR due to bicep fatigue.     Rancho is progressing well towards his goals.   Pt prognosis is Good.   Pt will continue to benefit from skilled outpatient physical therapy to address the deficits listed in the problem list box on initial evaluation, provide pt/family education and to maximize pt's level of independence in the home and community environment.     Pt's spiritual, cultural and educational needs considered and pt agreeable to plan of care and goals.  Anticipated barriers to physical therapy: comorbidities     Goals:   Short Term Goals (4 Weeks):  1. Pt will be independent with HEP to supplement PT in improving pain free cervical mobility MET  2. Pt will improve cervical AROM 10 deg in all planes to improve cervical mobility. PROGRESSING, NOT MET 5/8/2019   3. Pt will improve impaired UE MMTs by 1/2 grade in all planes to improve strength for functional tasks. PROGRESSING, NOT MET 5/8/2019   4. Pt will demonstrate improved sitting posture to decrease pain experienced in neck. PROGRESSING, NOT MET 5/8/2019   Long Term Goals (8 Weeks):   1. Pt will improve FOTO to </=50% limitation to improve perceived limitation with changing and maintaining mobility. PROGRESSING, NOT MET 5/8/2019   2. Pt will improve cervical AROM to WFL in all planes to improve cervical mobility. PROGRESSING, NOT MET 5/8/2019   3. Pt will improve impaired UE MMTs 1 grade in all planes to improve strength for functional tasks. PROGRESSING, NOT MET 5/8/2019    4. Pt will report pain </= 2/10 at worst to promote return to PLOF. PROGRESSING, NOT MET 5/8/2019   5. Pt will report pain </= 2/10 with cervical AROM in all planes to promote functional QOL. PROGRESSING, NOT MET 5/8/2019     Plan     Continue  plan of care. Gradual progression of L UE strength.     Rita Bagley, PT

## 2019-05-13 ENCOUNTER — CLINICAL SUPPORT (OUTPATIENT)
Dept: REHABILITATION | Facility: HOSPITAL | Age: 64
End: 2019-05-13
Payer: MEDICAID

## 2019-05-13 DIAGNOSIS — R29.898 DECREASED ROM OF NECK: ICD-10-CM

## 2019-05-13 DIAGNOSIS — R29.3 POOR POSTURE: ICD-10-CM

## 2019-05-13 DIAGNOSIS — R29.898 DECREASED STRENGTH OF UPPER EXTREMITY: ICD-10-CM

## 2019-05-13 PROCEDURE — 97110 THERAPEUTIC EXERCISES: CPT | Mod: PN

## 2019-05-13 NOTE — PLAN OF CARE
"  Outpatient Therapy Updated Plan of Care     Visit Date: 5/13/2019  Name: Rancho Contreras  Clinic Number: [id number]    Therapy Diagnosis:   Encounter Diagnoses   Name Primary?    Decreased ROM of neck     Decreased strength of upper extremity     Poor posture      Physician: Dong Ray    Physician Orders: PT Eval and Treat   Medical Diagnosis from Referral: M47.12 (ICD-10-CM) - Cervical myelopathy with cervical radiculopathy  Evaluation Date: 3/19/2019    Total Visits Received: 17    Current Certification Period:  3/19/19 to 5/17/19  Precautions:  Standard, diabetes, HTN  Functional Level Prior to Evaluation:  Independent     Subjective     Update: Pt reports 30% improvement in his symptoms since SOC. Pt stated that he still has weakness in (L) UE and difficulty performing tasks with (L) UE.    Objective     Update:     Cervical AROM: Pain/Dysfunction with Movement:   Flexion 55 degrees "stiff"    Extension 45 degrees     Right side bending 25 degrees  C/o increased pain in (R) neck/UT region   Left side bending 25 degrees C/o pulling in (R) UT region   Right rotation 75 degrees  C/o pain/stretching in (R) neck region   Left rotation 78 degrees       Shoulder Right     Left   Pain/Dysfunction with Movement     AROM MMT AROM PROM MMT  != pain   flexion 164 5/5 163 NT 4+/5     abduction 170  5/5 170 NT 4/5     Internal rotation 80 5/5 80 NT 4+/5     ER at 90° abd 90 NT 75! NT NT Report of pain around supraspinatus    ER at 0° abd NT 5/5 NT NT 5/5        Elbow Right   Left   Pain/Dysfunction with Movement     AROM MMT AROM MMT     flexion WFL 5/5 WFL 4-/5     extension WFL 5/5 WFL 4+/5         (#)     Right 56.6   Left 45         Assessment     Update: Since initial evaluation on 3/19/19 pt has attended 16 PT follow up sessions. Pt has made steady progress with his PT treatments as evident by subjective and objective improvements. Pt reports 30% improvement in symptoms since SOC, demo improved " cervical AROM, improved (L) UE strength, improved  strength, and improved (L) shoulder flexion and abduction AROM compared to measurements taken on initial evaluation. Despite these improvements, pt still presents with decreased (L) UE strength, decreased (L) shoulder AROM, and decreased functional use of (L) UE. Therefore, pt will continue to benefit from skilled PT to address the limitations listed above in order to return pt to his highest level of function with decreased pain and limitation.     Previous Short Term Goals Status:       Short Term Goals (4 Weeks):  1. Pt will be independent with HEP to supplement PT in improving pain free cervical mobility MET  2. Pt will improve cervical AROM 10 deg in all planes to improve cervical mobility. PARTIALLY MET  3. Pt will improve impaired UE MMTs by 1/2 grade in all planes to improve strength for functional tasks. PARTIALLY MET  4. Pt will demonstrate improved sitting posture to decrease pain experienced in neck. PARTIALLY MET    Long Term Goals (8 Weeks):   1. Pt will improve FOTO to </=50% limitation to improve perceived limitation with changing and maintaining mobility.  NOT MET    2. Pt will improve cervical AROM to WFL in all planes to improve cervical mobility.  NOT MET   3. Pt will improve impaired UE MMTs 1 grade in all planes to improve strength for functional tasks. NOT MET   4. Pt will report pain </= 2/10 at worst to promote return to PLOF.  NOT MET   5. Pt will report pain </= 2/10 with cervical AROM in all planes to promote functional QOL.  NOT MET        New Short Term Goals Status:   Continue with STG 2,3 and all LTG   Long Term Goal Status:   continue per initial plan of care.  Reasons for Recertification of Therapy:   Required updated POC    Plan     Updated Certification Period: 5/13/2019 to 6/14/19  Recommended Treatment Plan: 2 times per week for 4 weeks: Manual Therapy, Moist Heat/ Ice, Neuromuscular Re-ed, Patient Education, Self Care,  Therapeutic Activites, Therapeutic Exercise and ASTYM, FDN, and modalities prn  Other Recommendations: n/a     Chasidy Arias, PT  5/13/2019      I CERTIFY THE NEED FOR THESE SERVICES FURNISHED UNDER THIS PLAN OF TREATMENT AND WHILE UNDER MY CARE    Physician's comments:        Physician's Signature: ___________________________________________________

## 2019-05-13 NOTE — PROGRESS NOTES
"  Physical Therapy Daily Treatment Note     Name: Rancho Contreras  Clinic Number: [id number]    Therapy Diagnosis:   Encounter Diagnoses   Name Primary?    Decreased ROM of neck     Decreased strength of upper extremity     Poor posture      Physician: Dong Ray    Visit Date: 5/13/2019    Physician Orders: PT Eval and Treat   Medical Diagnosis from Referral: M47.12 (ICD-10-CM) - Cervical myelopathy with cervical radiculopathy  Evaluation Date: 3/19/2019  Authorization Period Expiration: 3/31/2019  Plan of Care Expiration: 5/17/2019  Visit # / Visits authorized: 3/5 (17 total)  FOTO: 17    Time In: 2:10 pm   Time Out: 2:59 pm   Total Billable Time: 29 minutes    Precautions: Standard, Diabetes and HTN    Subjective     Pt reports: that he is feeling a little bit better. Pt reported experiencing a little stiffness in cervical region.   He was compliant with home exercise program.  Response to previous treatment: no adverse effects  Functional change: none    Pain: 4/10  Location: right shoulder      Objective     Rancho received therapeutic exercises to develop strength and ROM for 20 minutes supervised and x 29' 1:1 with  including: below and objective measurements      Seated:  R upper trapezius stretch: 3x30"  Box shoulder rolls: x20  L bicep curls: 1# x5, 0# x5 palm up; 0# x10 thumb up L;  0# x15 palm down   L eccentric bicep lowering: 3x5, active assistance for starting position; focus on control throughout range - not performed today  Active shoulder abduction: L 1x5 1#, 1x15 0#  Active shoulder flexion: 2x10 L   Breuggers: GTB 3x10     Standing:   Rows: Cables 7# 2x15 - not performed today  Shoulder extension: Cables 7# 2x15 - not performed today  Internal rotation: Cables 3# x10 L; minimum assistant to maintain 90 degrees of elbow flexion  - not performed today  External rotation: not performed today   3/3  Home Exercises Provided and Patient Education Provided     Education provided: "   - Continue with HEP    Written Home Exercises Provided: Patient instructed to cont prior HEP.  Exercises were reviewed and Rancho was able to demonstrate them prior to the end of the session.  Rancho demonstrated good  understanding of the education provided.     See EMR under Patient Instructions for exercises provided 3/21/19.      Assessment     See treatment section for updated POC  Rancho is progressing well towards his goals.   Pt prognosis is Good.     Pt will continue to benefit from skilled outpatient physical therapy to address the deficits listed in the problem list box on initial evaluation, provide pt/family education and to maximize pt's level of independence in the home and community environment.     Pt's spiritual, cultural and educational needs considered and pt agreeable to plan of care and goals.    Anticipated barriers to physical therapy: comorbidities     Goals: see treatment section for updated POC    Plan     See treatment section for updated POC    Chasidy Arias, PT

## 2019-05-15 ENCOUNTER — CLINICAL SUPPORT (OUTPATIENT)
Dept: REHABILITATION | Facility: HOSPITAL | Age: 64
End: 2019-05-15
Payer: MEDICAID

## 2019-05-15 DIAGNOSIS — R29.3 POOR POSTURE: ICD-10-CM

## 2019-05-15 DIAGNOSIS — R29.898 DECREASED ROM OF NECK: ICD-10-CM

## 2019-05-15 DIAGNOSIS — R29.898 DECREASED STRENGTH OF UPPER EXTREMITY: ICD-10-CM

## 2019-05-15 PROCEDURE — 97110 THERAPEUTIC EXERCISES: CPT | Mod: PN

## 2019-05-15 NOTE — PROGRESS NOTES
"  Physical Therapy Daily Treatment Note     Name: Rancho Contreras  Clinic Number: [id number]    Therapy Diagnosis:   Encounter Diagnoses   Name Primary?    Decreased ROM of neck     Decreased strength of upper extremity     Poor posture      Physician: Dong Ray    Visit Date: 5/15/2019    Physician Orders: PT Eval and Treat   Medical Diagnosis from Referral: M47.12 (ICD-10-CM) - Cervical myelopathy with cervical radiculopathy  Evaluation Date: 3/19/2019  Authorization Period Expiration: 3/31/2019  Plan of Care Expiration: 5/17/2019  Visit # / Visits authorized: 4/5 (18 total)  FOTO: 18    Time In: 2:06 pm   Time Out: 2:50 pm   Total Billable Time: 38 minutes    Precautions: Standard, Diabetes and HTN    Subjective     Pt reports: (R) Side of his neck feels stiff.   He was compliant with home exercise program.  Response to previous treatment: no adverse effects   Functional change: none    Pain: 3/10 stiffness   Location: right neck      Objective     Rancho received the following manual therapy techniques:  for 10 minutes including:    Soft tissue mobilization to R upper trapezius, levator scapulae, cervical paraspinals      Rancho received therapeutic exercises to develop strength and ROM for 28 minutes 1:1 with PT including below and UBE x 6' supervised.      Seated:  UBE 3'/3' 120  R upper trapezius stretch: 3x30" - not performed today  Box shoulder rolls: x20 - not performed today  L bicep curls: 1# x5, 0# x5 palm up; 0# x10 thumb up L;  0# x15 palm down   L eccentric bicep lowering: 3x5, active assistance for starting position; focus on control throughout range - not performed today  Active shoulder abduction: 2x10 L  Active shoulder flexion: 2x10 L   Breuggers: GTB 3x10     Standing:   Rows: Cables 7# 2x15 - assistance from PT to maintain elbow flexion  Shoulder extension: Cables 7# 2x15 -   Internal rotation: GTB 2 x10 L; assistance from PT to maintain elbow flexion   External rotation: RTB " "1x10 assistance from PT to maintain elbow flexion  Corner pec stretch 3x30"     Home Exercises Provided and Patient Education Provided     Education provided:   - Continue with HEP     Written Home Exercises Provided: Patient instructed to cont prior HEP.  Exercises were reviewed and Rancho was able to demonstrate them prior to the end of the session.  Rancho demonstrated good  understanding of the education provided.     See EMR under Patient Instructions for exercises provided 3/21/19.      Assessment     Pt's (L) bicep is still quick to fatigue and pt required assistance from PT  to maintain (L) elbow flexion when performing therex noted above. Pt tolerated therapy session without c/o increased pain or adverse reactions.   Rancho is progressing well towards his goals.   Pt prognosis is Good.     Pt will continue to benefit from skilled outpatient physical therapy to address the deficits listed in the problem list box on initial evaluation, provide pt/family education and to maximize pt's level of independence in the home and community environment.     Pt's spiritual, cultural and educational needs considered and pt agreeable to plan of care and goals.    Anticipated barriers to physical therapy: comorbidities    Goals:     Short Term Goals (4 Weeks):  1. Pt will be independent with HEP to supplement PT in improving pain free cervical mobility MET  2. Pt will improve cervical AROM 10 deg in all planes to improve cervical mobility. PARTIALLY MET  3. Pt will improve impaired UE MMTs by 1/2 grade in all planes to improve strength for functional tasks. PARTIALLY MET  4. Pt will demonstrate improved sitting posture to decrease pain experienced in neck. PARTIALLY MET     Long Term Goals (8 Weeks):   1. Pt will improve FOTO to </=50% limitation to improve perceived limitation with changing and maintaining mobility.  NOT MET    2. Pt will improve cervical AROM to WFL in all planes to improve cervical mobility.  NOT MET   3. " Pt will improve impaired UE MMTs 1 grade in all planes to improve strength for functional tasks. NOT MET   4. Pt will report pain </= 2/10 at worst to promote return to PLOF.  NOT MET   5. Pt will report pain </= 2/10 with cervical AROM in all planes to promote functional QOL.  NOT MET          New Short Term Goals Status:   Continue with STG 2,3 and all LTG       Plan     Continue per POC, progress as tolerated    Chasidy Arias, PT

## 2019-05-20 ENCOUNTER — CLINICAL SUPPORT (OUTPATIENT)
Dept: REHABILITATION | Facility: HOSPITAL | Age: 64
End: 2019-05-20
Payer: MEDICAID

## 2019-05-20 DIAGNOSIS — R29.898 DECREASED ROM OF NECK: ICD-10-CM

## 2019-05-20 DIAGNOSIS — R29.898 DECREASED STRENGTH OF UPPER EXTREMITY: ICD-10-CM

## 2019-05-20 DIAGNOSIS — R29.3 POOR POSTURE: ICD-10-CM

## 2019-05-20 PROCEDURE — 97110 THERAPEUTIC EXERCISES: CPT | Mod: PN

## 2019-05-20 NOTE — PROGRESS NOTES
Physical Therapy Daily Treatment Note     Name: Rancho Contreras  Clinic Number: [id number]    Therapy Diagnosis:   Encounter Diagnoses   Name Primary?    Decreased ROM of neck     Decreased strength of upper extremity     Poor posture      Physician: Dong Ray    Visit Date: 5/20/2019    Physician Orders: PT Eval and Treat   Medical Diagnosis from Referral: M47.12 (ICD-10-CM) - Cervical myelopathy with cervical radiculopathy  Evaluation Date: 3/19/2019  Authorization Period Expiration: 3/31/2019  Plan of Care Expiration: 5/17/2019  Visit # / Visits authorized: 5/5 (19 total)  FOTO: 19    Time In: 1:05 pm   Time Out: 2:00 pm   Total Billable Time: 55 minutes (4 TE)    Precautions: Standard, Diabetes and HTN    Subjective     Pt reports: right neck stiffness and numbness in 1-3 L hand palmar side of digits  He was compliant with home exercise program.  Response to previous treatment: no adverse effects   Functional change: none    Pain: 3/10 stiffness   Location: right neck      Objective   O: significant L C4-5 myotomal weakness, weak L shoulder IR/ER, decreased R>L cervical sidebending and rotation, good rotation overall considering C3-C5 cervical fusion  Rancho received the following manual therapy techniques:  for 10 minutes including:  - Soft tissue mobilization to R upper trapezius, levator scapulae, and B cervical paraspinals  - cervical sideglides to C6-7 and C1-2 Grade III-IV  - upper and lower manual cervical traction    Rancho received therapeutic exercises to develop strength and ROM for 45 minutes 1:1 with PT including below:   Mat:  Dowel flexion: 20x, 8#  SL hip abd: 3x10     Seated:  UBE 3'/3' @60  Scapular retractions: 20x5'' - d/c next  L bicep curls: 1# x5, 0# x5 palm up; 0# 10x thumb up L;  0# x10 palm down palm up and down not performed  L eccentric bicep lowering: 3x5, active assistance for starting position; focus on control throughout range  Active shoulder abduction: 2x10 -  "not performed today  Active shoulder flexion: 2x10 L - not performed today  Breuggers: GTB 3x10 - not performed today     Standing:   Rows: Cables 7# 2x15 - assistance from PT to maintain elbow flexion  Shoulder extension: 10 pulses, 6 rounds RTB   Internal rotation: GTB 2 x15 L; assistance from PT to maintain elbow flexion   External rotation: RTB 2x10 assistance from PT to maintain elbow flexion  Corner pec stretch 3x30"  - not performed today    Home Exercises Provided and Patient Education Provided   Education provided:   - Continue with HEP     Written Home Exercises Provided: Patient instructed to cont prior HEP.  Exercises were reviewed and Rancho was able to demonstrate them prior to the end of the session.  Rancho demonstrated good  understanding of the education provided.     See EMR under Patient Instructions for exercises provided 3/21/19.      Assessment     Significant C5 myotomal weakness with poor endurance. Pt unable to perform bicep curl reps at end of session.    Rancho is progressing well towards his goals.   Pt prognosis is Good.     Pt will continue to benefit from skilled outpatient physical therapy to address the deficits listed in the problem list box on initial evaluation, provide pt/family education and to maximize pt's level of independence in the home and community environment.     Pt's spiritual, cultural and educational needs considered and pt agreeable to plan of care and goals.    Anticipated barriers to physical therapy: comorbidities    Goals:     Short Term Goals (4 Weeks):  1. Pt will be independent with HEP to supplement PT in improving pain free cervical mobility MET  2. Pt will improve cervical AROM 10 deg in all planes to improve cervical mobility. PARTIALLY MET  3. Pt will improve impaired UE MMTs by 1/2 grade in all planes to improve strength for functional tasks. PARTIALLY MET  4. Pt will demonstrate improved sitting posture to decrease pain experienced in neck. PARTIALLY " MET     Long Term Goals (8 Weeks):   1. Pt will improve FOTO to </=50% limitation to improve perceived limitation with changing and maintaining mobility.  NOT MET    2. Pt will improve cervical AROM to WFL in all planes to improve cervical mobility.  NOT MET   3. Pt will improve impaired UE MMTs 1 grade in all planes to improve strength for functional tasks. NOT MET   4. Pt will report pain </= 2/10 at worst to promote return to PLOF.  NOT MET   5. Pt will report pain </= 2/10 with cervical AROM in all planes to promote functional QOL.  NOT MET          New Short Term Goals Status:   Continue with STG 2,3 and all LTG       Plan     Continue per POC, progress as tolerated    Hank Jacobsen, PT

## 2019-05-24 ENCOUNTER — CLINICAL SUPPORT (OUTPATIENT)
Dept: REHABILITATION | Facility: HOSPITAL | Age: 64
End: 2019-05-24
Payer: MEDICAID

## 2019-05-24 DIAGNOSIS — R29.898 DECREASED ROM OF NECK: ICD-10-CM

## 2019-05-24 DIAGNOSIS — R29.3 POOR POSTURE: ICD-10-CM

## 2019-05-24 DIAGNOSIS — R29.898 DECREASED STRENGTH OF UPPER EXTREMITY: ICD-10-CM

## 2019-05-24 PROCEDURE — 97110 THERAPEUTIC EXERCISES: CPT | Mod: PN

## 2019-05-24 NOTE — PROGRESS NOTES
"  Physical Therapy Daily Treatment Note     Name: Rancho Contreras  Clinic Number: [id number]    Therapy Diagnosis:   Encounter Diagnoses   Name Primary?    Decreased ROM of neck     Decreased strength of upper extremity     Poor posture      Physician: Dong Ray    Visit Date: 5/24/2019    Physician Orders: PT Eval and Treat   Medical Diagnosis from Referral: M47.12 (ICD-10-CM) - Cervical myelopathy with cervical radiculopathy  Evaluation Date: 3/19/2019  Authorization Period Expiration: 6/30/2019  Plan of Care Expiration: 6/14/2019  Visit # / Visits authorized: 1/12 (20 total)  FOTO: at dc    Time In: 1:05 pm   Time Out: 2:00 pm   Total Billable Time: 30 minutes (2 TE)    Precautions: Standard, Diabetes and HTN    Subjective     Pt reports: right neck stiffness   He was compliant with home exercise program.  Response to previous treatment: no adverse effects   Functional change: none    Pain: 2/10 "uncomfortable"   Location: right neck      Objective     Rancho received the following manual therapy techniques:  for 10 minutes including:  - Soft tissue mobilization to R upper trapezius, levator scapulae,with manual stretch to same  - STM to B cervical paraspinals with elongation  - B Upper trap release focus Right  - B upper thoracic release focus right    Rancho received therapeutic exercises to develop strength and ROM for 20 minutes 1:1 with PTA, additional 25 with supervision including below:   Mat:  Dowel flexion: 20x, 8#  SL shoulder abd: 2x15     Seated:  UBE 3'/3' @60    L bicep curls: 1# 2x5  palm up; 1# 5, 2, 3 thumb up L;  0# x10 palm down, palm up and down not performed  L eccentric bicep lowering: 1x5, active assistance for starting position; focus on control   Active shoulder abduction: 2x10 - not performed today  Active shoulder flexion: 2x10 L - not performed today  Breuggers: GTB 3x10      Standing:   Rows: Cables 7# 2x15 - assistance from PTA to maintain elbow flexion  Shoulder " "extension: 10 pulses, 6 rounds RTB   Internal rotation: GTB 2 x15 L; assistance from tech to maintain elbow flexion   External rotation: RTB 2x10 assistance from tech to maintain elbow flexion  Corner pec stretch 3x30"      Home Exercises Provided and Patient Education Provided   Education provided:   - Continue with HEP     Written Home Exercises Provided: Patient instructed to cont prior HEP.  Exercises were reviewed and Rancho was able to demonstrate them prior to the end of the session.  Rancho demonstrated good  understanding of the education provided.     See EMR under Patient Instructions for exercises provided 3/21/19.      Assessment     Patient with difficulty and required assist as noted with L UE positioning during rows as well as IR/ER.  Unable to control left biceps for eccentric lowering without assist. Required short rest breaks between reps as noted with bicep curls.  Patient able to complete therex as noted without complaint of increased pain.  States "OK" after treatment.  "Maybe a 1/10 now" when asked.    Rancho is progressing well towards his goals.   Pt prognosis is Good.     Pt will continue to benefit from skilled outpatient physical therapy to address the deficits listed in the problem list box on initial evaluation, provide pt/family education and to maximize pt's level of independence in the home and community environment.     Pt's spiritual, cultural and educational needs considered and pt agreeable to plan of care and goals.    Anticipated barriers to physical therapy: comorbidities    Goals:     Short Term Goals (4 Weeks):  1. Pt will be independent with HEP to supplement PT in improving pain free cervical mobility MET  2. Pt will improve cervical AROM 10 deg in all planes to improve cervical mobility. PARTIALLY MET  3. Pt will improve impaired UE MMTs by 1/2 grade in all planes to improve strength for functional tasks. PARTIALLY MET  4. Pt will demonstrate improved sitting posture to " decrease pain experienced in neck. PARTIALLY MET     Long Term Goals (8 Weeks):   1. Pt will improve FOTO to </=50% limitation to improve perceived limitation with changing and maintaining mobility.  NOT MET    2. Pt will improve cervical AROM to WFL in all planes to improve cervical mobility.  NOT MET   3. Pt will improve impaired UE MMTs 1 grade in all planes to improve strength for functional tasks. NOT MET   4. Pt will report pain </= 2/10 at worst to promote return to PLOF.  NOT MET   5. Pt will report pain </= 2/10 with cervical AROM in all planes to promote functional QOL.  NOT MET          New Short Term Goals Status:   Continue with STG 2,3 and all LTG       Plan     Continue per POC, progress as tolerated    Frida Kimbrough, PTA

## 2019-05-29 ENCOUNTER — TELEPHONE (OUTPATIENT)
Dept: REHABILITATION | Facility: HOSPITAL | Age: 64
End: 2019-05-29

## 2019-06-03 ENCOUNTER — CLINICAL SUPPORT (OUTPATIENT)
Dept: REHABILITATION | Facility: HOSPITAL | Age: 64
End: 2019-06-03
Payer: MEDICAID

## 2019-06-03 DIAGNOSIS — R29.3 POOR POSTURE: ICD-10-CM

## 2019-06-03 DIAGNOSIS — R29.898 DECREASED STRENGTH OF UPPER EXTREMITY: ICD-10-CM

## 2019-06-03 DIAGNOSIS — R29.898 DECREASED ROM OF NECK: ICD-10-CM

## 2019-06-03 PROCEDURE — 97110 THERAPEUTIC EXERCISES: CPT | Mod: PN

## 2019-06-05 ENCOUNTER — CLINICAL SUPPORT (OUTPATIENT)
Dept: REHABILITATION | Facility: HOSPITAL | Age: 64
End: 2019-06-05
Payer: MEDICAID

## 2019-06-05 DIAGNOSIS — R29.898 DECREASED ROM OF NECK: ICD-10-CM

## 2019-06-05 DIAGNOSIS — R29.898 DECREASED STRENGTH OF UPPER EXTREMITY: ICD-10-CM

## 2019-06-05 DIAGNOSIS — R29.3 POOR POSTURE: ICD-10-CM

## 2019-06-05 PROCEDURE — 97110 THERAPEUTIC EXERCISES: CPT | Mod: PN | Performed by: PHYSICAL THERAPIST

## 2019-06-05 NOTE — PROGRESS NOTES
"  Physical Therapy Daily Treatment Note     Name: Rancho Contreras  Clinic Number: [id number]    Therapy Diagnosis:   Encounter Diagnoses   Name Primary?    Decreased ROM of neck     Decreased strength of upper extremity     Poor posture      Physician: Dong Ray    Visit Date: 6/3/2019    Physician Orders: PT Eval and Treat   Medical Diagnosis from Referral: M47.12 (ICD-10-CM) - Cervical myelopathy with cervical radiculopathy  Evaluation Date: 3/19/2019  Authorization Period Expiration: 6/30/2019  Plan of Care Expiration: 6/14/2019    Visit # / Visits authorized: 2/12 (21 total)  FOTO: at dc    Time In: 1425   Time Out: 1500   Total Billable Time: 15 minutes (1 TE)  Precautions: Standard, Diabetes and HTN    Subjective     Pt reports: right neck stiffness especially with turning his head to the R and reports LUE weakness.   He was compliant with home exercise program.  Response to previous treatment: no adverse effects   Functional change: greatly improved LUE strength since his surgery but still reports weakness.     Pain: 2/10 "uncomfortable"   Location: right neck      Objective     Rancho received the following manual therapy techniques: none today    Rancho received therapeutic exercises to develop strength and ROM for 15 minutes 1:1 with PT, additional 20 with supervision including below:   Mat:  Dowel flexion: 20x, 8#  SL shoulder abd: 2x15     Seated:  UBE 3'/3' @60    L bicep curls: 1# 2x5  palm up; 1# 5, 2, 3 thumb up L;  0# x10 palm down, palm up and down not performed  L eccentric bicep lowering: 1x5, active assistance for starting position; focus on control   Active shoulder abduction: 2x10 - not performed today  Active shoulder flexion: 2x10 L - not performed today  Breuggers: GTB 3x10      Standing:   Rows: Cables 7# 2x15 - assistance from PTA to maintain elbow flexion  Shoulder extension: 10 pulses, 6 rounds RTB   Internal rotation: GTB 2 x15 L; assistance from tech to maintain elbow " "flexion   External rotation: RTB 2x10 assistance from tech to maintain elbow flexion  Corner pec stretch 3x30"      Home Exercises Provided and Patient Education Provided   Education provided:   - Continue with HEP     Written Home Exercises Provided: Patient instructed to cont prior HEP.  Exercises were reviewed and Rancho was able to demonstrate them prior to the end of the session.  Rancho demonstrated good  understanding of the education provided.     See EMR under Patient Instructions for exercises provided 3/21/19.      Assessment   A: s/p multi-level ACDF.  Pre-surgery LUE paresis; improved since surgery but still quick too fatigue and with decreased strength/power.  Good cervical ROM considering multi-level ADCF.     Rancho is progressing well towards his goals.   Pt prognosis is Good.     Pt will continue to benefit from skilled outpatient physical therapy to address the deficits listed in the problem list box on initial evaluation, provide pt/family education and to maximize pt's level of independence in the home and community environment.   Pt's spiritual, cultural and educational needs considered and pt agreeable to plan of care and goals.    Anticipated barriers to physical therapy: comorbidities    Goals:     Short Term Goals (4 Weeks):  1. Pt will be independent with HEP to supplement PT in improving pain free cervical mobility MET  2. Pt will improve cervical AROM 10 deg in all planes to improve cervical mobility. PARTIALLY MET  3. Pt will improve impaired UE MMTs by 1/2 grade in all planes to improve strength for functional tasks. PARTIALLY MET  4. Pt will demonstrate improved sitting posture to decrease pain experienced in neck. PARTIALLY MET     Long Term Goals (8 Weeks):   1. Pt will improve FOTO to </=50% limitation to improve perceived limitation with changing and maintaining mobility.  NOT MET    2. Pt will improve cervical AROM to WFL in all planes to improve cervical mobility.  NOT MET   3. Pt " will improve impaired UE MMTs 1 grade in all planes to improve strength for functional tasks. NOT MET   4. Pt will report pain </= 2/10 at worst to promote return to PLOF.  NOT MET   5. Pt will report pain </= 2/10 with cervical AROM in all planes to promote functional QOL.  NOT MET          New Short Term Goals Status:   Continue with STG 2,3 and all LTG       Plan   Continue per POC, progress as tolerated    Raad Mclean, PT

## 2019-06-05 NOTE — PROGRESS NOTES
"  Physical Therapy Daily Treatment Note     Name: Rancho Contreras  Clinic Number: [id number]    Therapy Diagnosis:   Encounter Diagnoses   Name Primary?    Decreased ROM of neck     Decreased strength of upper extremity     Poor posture      Physician: Dong Ray    Visit Date: 6/5/2019    Physician Orders: PT Eval and Treat   Medical Diagnosis from Referral: M47.12 (ICD-10-CM) - Cervical myelopathy with cervical radiculopathy  Evaluation Date: 3/19/2019  Authorization Period Expiration: 6/30/2019  Plan of Care Expiration: 6/14/2019    Visit # / Visits authorized: 3/12 (21 total)  FOTO: at dc    Time In: 0230  Time Out: 0324   Total Billable Time: 54 minutes (4 TE)  Precautions: Standard, Diabetes and HTN    Subjective     Pt reports: stiffness in cervical spine, a little more achy on right side compared to left.   He was compliant with home exercise program.  Response to previous treatment: no adverse effects   Functional change: LUE strength, biceps still very weak.     Pain: 1/10 "uncomfortable"   Location: right neck      Objective     Rancho received the following manual therapy techniques: none today    Rancho received therapeutic exercises to develop strength and ROM for 15 minutes 1:1 with PT, additional 20 with supervision including below:   Mat:  Dowel flexion: 20x, 8#  SL shoulder abd: 2x15 2#  SL flexion 2x15 2#    Seated:  UBE 3'/3' @60    L bicep curls: 1# 2x5  palm up; 1# 5, 2, 3 thumb up L;  0# x10 palm down, palm up and down  L eccentric bicep lowering: 1x5, active assistance for starting position; focus on control   Active shoulder abduction: 2x10   Active shoulder flexion: 2x10 L   Breuggers: GTB 3x10      Standing:   Scapular retractions: purple SC: 3x12  Shoulder extension: 3x10 GTB  Internal rotation: RTB 2 x15 L; assistance from tech to maintain elbow flexion   External rotation: RTB 2x10 assistance from tech to maintain elbow flexion  Corner pec stretch 3x30"    Wand biceps " curls 2# - 3x10    Home Exercises Provided and Patient Education Provided   Education provided:   - Continue with HEP     Written Home Exercises Provided: Patient instructed to cont prior HEP.  Exercises were reviewed and Rancho was able to demonstrate them prior to the end of the session.  Rancho demonstrated good  understanding of the education provided.     See EMR under Patient Instructions for exercises provided 3/21/19.      Assessment   A: s/p multi-level ACDF.. Severe left biceps weakness noted but no further weaknesses along LUE during therex. Pt elbow flexion fatigues following 6 repetitions with 1# weight but recovers following rest period.     Rancho is progressing well towards his goals.   Pt prognosis is Good.     Pt will continue to benefit from skilled outpatient physical therapy to address the deficits listed in the problem list box on initial evaluation, provide pt/family education and to maximize pt's level of independence in the home and community environment.   Pt's spiritual, cultural and educational needs considered and pt agreeable to plan of care and goals.    Anticipated barriers to physical therapy: comorbidities    Goals:     Short Term Goals (4 Weeks):  1. Pt will be independent with HEP to supplement PT in improving pain free cervical mobility MET  2. Pt will improve cervical AROM 10 deg in all planes to improve cervical mobility. PARTIALLY MET  3. Pt will improve impaired UE MMTs by 1/2 grade in all planes to improve strength for functional tasks. PARTIALLY MET  4. Pt will demonstrate improved sitting posture to decrease pain experienced in neck. PARTIALLY MET     Long Term Goals (8 Weeks):   1. Pt will improve FOTO to </=50% limitation to improve perceived limitation with changing and maintaining mobility.  NOT MET    2. Pt will improve cervical AROM to WFL in all planes to improve cervical mobility.  NOT MET   3. Pt will improve impaired UE MMTs 1 grade in all planes to improve strength  for functional tasks. NOT MET   4. Pt will report pain </= 2/10 at worst to promote return to PLOF.  NOT MET   5. Pt will report pain </= 2/10 with cervical AROM in all planes to promote functional QOL.  NOT MET          New Short Term Goals Status:   Continue with STG 2,3 and all LTG       Plan   Continue per POC, progress as tolerated    Jean Pierre Lozada, PT

## 2019-06-12 ENCOUNTER — CLINICAL SUPPORT (OUTPATIENT)
Dept: REHABILITATION | Facility: HOSPITAL | Age: 64
End: 2019-06-12
Payer: MEDICAID

## 2019-06-12 DIAGNOSIS — R29.898 DECREASED ROM OF NECK: ICD-10-CM

## 2019-06-12 DIAGNOSIS — R29.898 DECREASED STRENGTH OF UPPER EXTREMITY: ICD-10-CM

## 2019-06-12 DIAGNOSIS — R29.3 POOR POSTURE: ICD-10-CM

## 2019-06-12 PROCEDURE — 97110 THERAPEUTIC EXERCISES: CPT | Mod: PN

## 2019-06-12 NOTE — PROGRESS NOTES
Physical Therapy Daily Treatment/Discharge Note     Name: Rancho Contreras  Clinic Number: [id number]    Therapy Diagnosis:   Encounter Diagnoses   Name Primary?    Decreased ROM of neck     Decreased strength of upper extremity     Poor posture      Physician: Dong Ray    Visit Date: 6/12/2019    Physician Orders: PT Eval and Treat   Medical Diagnosis from Referral: M47.12 (ICD-10-CM) - Cervical myelopathy with cervical radiculopathy  Evaluation Date: 3/19/2019  Authorization Period Expiration: 6/30/2019  Plan of Care Expiration: 6/14/2019    Visit # / Visits authorized: 5/12 (22 total)  FOTO: done    Time In: 1403  Time Out: 1500  Total Billable Time: minutes     Precautions: Standard, Diabetes and HTN    Subjective     Pt reports: some stiffness on R side of neck currently. Since beginning physical therapy patient reports 60% improvement overall. Improvements include neck and shoulder pain, stiffness and ROM. Patient attributes remaining 40% to stiffness in B shoulder and bicep strength.   He was compliant with home exercise program.  Response to previous treatment: improved neck pain  Functional change: improved active L shoulder motion    Pain: 0/10 Location: R side of neck      Objective     Rancho received therapeutic exercises to develop strength and ROM for 57 minutes including objective measurements:     Objective measurements:  Cervical AROM: Pain/Dysfunction with Movement:   Flexion 48 degrees   Extension 50 degrees   Right side bending 33 degrees, pain to R side of neck   Left side bending 40 degrees, painful stretch to R side of neck   Right rotation 69 degrees; soreness on R side of neck   Left rotation 65 degrees, soreness on R side of neck     Shoulder  Right   Left  Pain/Dysfunction with Movement    AROM PROM MMT AROM PROM MMT    flexion WNL NT 4+/5 WNL NT 4-/5    abduction WNL NT 4+/5 WNL NT 4-/5    extension WNL NT 4+/5 WNL NT 4/5    Internal rotation WNL*! NT 4+/5! WNL* NT  "4/5 At 0, 45, and 90 degrees flexion; pain to R subacromial area   External rotation WNL* NT 4/5 WNL* NT 4/5 At 0, 45, and 90 degrees flexion     Elbow Right   Left   Pain/Dysfunction with Movement     AROM MMT AROM MMT     flexion WNL 5/5 WNL 3/5     extension WNL 5/5 WNL 5/5         (#)     Right 61   Left 41.3      Exerecise  3-way elbow flexion: x10 L  Flexion fly: YTB x15 L  Abduction fly: YTB x10 L; verbal and tactile cues to avoid shoulder shrug   Shoulder rolls: 2x20 forward and back  L upper trapezius stretch: 3x30" L c/ gentle overpressure    Review of HEP and appropriate progression for elbow flexion.    Home Exercises Provided and Patient Education Provided   Education provided:   - Patient given updated HEP including: therapeutic exercises performed above.  - E-mail PT in one month regarding status.    Written Home Exercises Provided: Yes.  Exercises were reviewed and Rancho was able to demonstrate them prior to the end of the session.  Rancho demonstrated good  understanding of the education provided.     See EMR under Patient Instructions for exercises provided 06/12/2019.    Assessment     Since beginning PT, pt has been seen 22 times since initial evaluation on 3/19/2019. Overall, he has made good, steady progress with his PT treatments and has worked hard towards all of his PT goals as evidenced by subjective and objective improvements. Since attending PT he has reached most of his PT goals. He will be discharged with an updated HEP and was instructed to contact us with any other questions or concerns. Pt agreeable to d/c.    Goals:   Short Term Goals (4 Weeks):  2. Pt will improve cervical AROM 10 deg in all planes to improve cervical mobility. PARTIALLY MET 06/12/2019   3. Pt will improve impaired UE MMTs by 1/2 grade in all planes to improve strength for functional tasks.MET 06/12/2019   Long Term Goals (8 Weeks):   1. Pt will improve FOTO to </=50% limitation to improve perceived limitation " with changing and maintaining mobility. PROGRESSING, NOT MET 6/12/2019   2. Pt will improve cervical AROM to WFL in all planes to improve cervical mobility. MET 6/12/2019   3. Pt will improve impaired UE MMTs 1 grade in all planes to improve strength for functional tasks.PARTIALLY MET 6/12/2019   4. Pt will report pain </= 2/10 at worst to promote return to PLOF. MET 6/12/2019   5. Pt will report pain </= 2/10 with cervical AROM in all planes to promote functional QOL.MET 6/12/2019      Plan     HEP compliance. Follow up in month regarding status.    Mary Jacobs, PT

## 2019-06-12 NOTE — PATIENT INSTRUCTIONS
3-way bicep curl    Bend your elbow up towards you with your palm facing up, down, and neutral. Perform 2 sets of 10 repetitions on your right arm. You may progress to 1 lb weight or yellow resistance band when this becomes easier. Perform 1 time a day.

## 2022-06-20 PROBLEM — H93.19 TINNITUS: Status: ACTIVE | Noted: 2022-06-20

## 2022-06-20 PROBLEM — L40.9 PSORIASIS: Status: ACTIVE | Noted: 2022-06-20

## 2022-06-20 PROBLEM — D69.6 THROMBOCYTOPENIA: Status: ACTIVE | Noted: 2022-06-20

## 2022-06-20 PROBLEM — E66.9 OBESITY: Status: ACTIVE | Noted: 2022-06-20

## 2022-06-20 PROBLEM — N52.9 IMPOTENCE OF ORGANIC ORIGIN: Status: ACTIVE | Noted: 2022-06-20

## 2022-06-20 PROBLEM — M77.10 LATERAL EPICONDYLITIS: Status: ACTIVE | Noted: 2022-06-20

## 2022-06-20 PROBLEM — E55.9 VITAMIN D DEFICIENCY: Status: ACTIVE | Noted: 2022-06-20

## 2022-06-29 ENCOUNTER — LAB VISIT (OUTPATIENT)
Dept: LAB | Facility: HOSPITAL | Age: 67
End: 2022-06-29
Attending: INTERNAL MEDICINE
Payer: MEDICARE

## 2022-06-29 DIAGNOSIS — Z12.5 PROSTATE CANCER SCREENING: ICD-10-CM

## 2022-06-29 DIAGNOSIS — D69.6 THROMBOCYTOPENIA: ICD-10-CM

## 2022-06-29 DIAGNOSIS — Z79.4 TYPE 2 DIABETES MELLITUS WITHOUT COMPLICATION, WITH LONG-TERM CURRENT USE OF INSULIN: ICD-10-CM

## 2022-06-29 DIAGNOSIS — E11.9 TYPE 2 DIABETES MELLITUS WITHOUT COMPLICATION, WITH LONG-TERM CURRENT USE OF INSULIN: ICD-10-CM

## 2022-06-29 LAB
ALBUMIN SERPL BCP-MCNC: 4.2 G/DL (ref 3.5–5.2)
ALP SERPL-CCNC: 69 U/L (ref 55–135)
ALT SERPL W/O P-5'-P-CCNC: 22 U/L (ref 10–44)
ANION GAP SERPL CALC-SCNC: 12 MMOL/L (ref 8–16)
AST SERPL-CCNC: 22 U/L (ref 10–40)
BASOPHILS # BLD AUTO: 0.06 K/UL (ref 0–0.2)
BASOPHILS NFR BLD: 0.8 % (ref 0–1.9)
BILIRUB SERPL-MCNC: 0.6 MG/DL (ref 0.1–1)
BUN SERPL-MCNC: 10 MG/DL (ref 8–23)
CALCIUM SERPL-MCNC: 9.5 MG/DL (ref 8.7–10.5)
CHLORIDE SERPL-SCNC: 105 MMOL/L (ref 95–110)
CHOLEST SERPL-MCNC: 176 MG/DL (ref 120–199)
CHOLEST/HDLC SERPL: 3.5 {RATIO} (ref 2–5)
CO2 SERPL-SCNC: 26 MMOL/L (ref 23–29)
COMPLEXED PSA SERPL-MCNC: 3.9 NG/ML (ref 0–4)
CREAT SERPL-MCNC: 0.8 MG/DL (ref 0.5–1.4)
DIFFERENTIAL METHOD: NORMAL
EOSINOPHIL # BLD AUTO: 0.2 K/UL (ref 0–0.5)
EOSINOPHIL NFR BLD: 3 % (ref 0–8)
ERYTHROCYTE [DISTWIDTH] IN BLOOD BY AUTOMATED COUNT: 13.3 % (ref 11.5–14.5)
EST. GFR  (AFRICAN AMERICAN): >60 ML/MIN/1.73 M^2
EST. GFR  (NON AFRICAN AMERICAN): >60 ML/MIN/1.73 M^2
ESTIMATED AVG GLUCOSE: 243 MG/DL (ref 68–131)
GLUCOSE SERPL-MCNC: 112 MG/DL (ref 70–110)
HBA1C MFR BLD: 10.1 % (ref 4–5.6)
HCT VFR BLD AUTO: 42.3 % (ref 40–54)
HDLC SERPL-MCNC: 50 MG/DL (ref 40–75)
HDLC SERPL: 28.4 % (ref 20–50)
HGB BLD-MCNC: 14.3 G/DL (ref 14–18)
IMM GRANULOCYTES # BLD AUTO: 0.03 K/UL (ref 0–0.04)
IMM GRANULOCYTES NFR BLD AUTO: 0.4 % (ref 0–0.5)
LDLC SERPL CALC-MCNC: 99.6 MG/DL (ref 63–159)
LYMPHOCYTES # BLD AUTO: 2.4 K/UL (ref 1–4.8)
LYMPHOCYTES NFR BLD: 30.9 % (ref 18–48)
MCH RBC QN AUTO: 30.2 PG (ref 27–31)
MCHC RBC AUTO-ENTMCNC: 33.8 G/DL (ref 32–36)
MCV RBC AUTO: 89 FL (ref 82–98)
MONOCYTES # BLD AUTO: 0.7 K/UL (ref 0.3–1)
MONOCYTES NFR BLD: 9.6 % (ref 4–15)
NEUTROPHILS # BLD AUTO: 4.3 K/UL (ref 1.8–7.7)
NEUTROPHILS NFR BLD: 55.3 % (ref 38–73)
NONHDLC SERPL-MCNC: 126 MG/DL
NRBC BLD-RTO: 0 /100 WBC
PLATELET # BLD AUTO: 308 K/UL (ref 150–450)
PMV BLD AUTO: 11.2 FL (ref 9.2–12.9)
POTASSIUM SERPL-SCNC: 4.4 MMOL/L (ref 3.5–5.1)
PROT SERPL-MCNC: 7.7 G/DL (ref 6–8.4)
RBC # BLD AUTO: 4.74 M/UL (ref 4.6–6.2)
SODIUM SERPL-SCNC: 143 MMOL/L (ref 136–145)
TRIGL SERPL-MCNC: 132 MG/DL (ref 30–150)
WBC # BLD AUTO: 7.69 K/UL (ref 3.9–12.7)

## 2022-06-29 PROCEDURE — 80061 LIPID PANEL: CPT | Performed by: INTERNAL MEDICINE

## 2022-06-29 PROCEDURE — 84153 ASSAY OF PSA TOTAL: CPT | Performed by: INTERNAL MEDICINE

## 2022-06-29 PROCEDURE — 80053 COMPREHEN METABOLIC PANEL: CPT | Performed by: INTERNAL MEDICINE

## 2022-06-29 PROCEDURE — 83036 HEMOGLOBIN GLYCOSYLATED A1C: CPT | Performed by: INTERNAL MEDICINE

## 2022-06-29 PROCEDURE — 85025 COMPLETE CBC W/AUTO DIFF WBC: CPT | Performed by: INTERNAL MEDICINE

## 2022-06-29 PROCEDURE — 36415 COLL VENOUS BLD VENIPUNCTURE: CPT | Performed by: INTERNAL MEDICINE

## 2022-11-08 ENCOUNTER — LAB VISIT (OUTPATIENT)
Dept: LAB | Facility: HOSPITAL | Age: 67
End: 2022-11-08
Attending: INTERNAL MEDICINE
Payer: MEDICARE

## 2022-11-08 DIAGNOSIS — E11.65 POORLY CONTROLLED DIABETES MELLITUS: ICD-10-CM

## 2022-11-08 PROBLEM — M54.16 LUMBAR RADICULOPATHY: Status: ACTIVE | Noted: 2022-07-25

## 2022-11-08 PROBLEM — Z91.148 NONCOMPLIANCE WITH MEDICATION REGIMEN: Status: ACTIVE | Noted: 2022-11-08

## 2022-11-08 PROBLEM — M47.26 OSTEOARTHRITIS OF SPINE WITH RADICULOPATHY, LUMBAR REGION: Status: ACTIVE | Noted: 2022-07-25

## 2022-11-08 LAB
ALBUMIN SERPL BCP-MCNC: 4 G/DL (ref 3.5–5.2)
ALP SERPL-CCNC: 66 U/L (ref 55–135)
ALT SERPL W/O P-5'-P-CCNC: 24 U/L (ref 10–44)
ANION GAP SERPL CALC-SCNC: 7 MMOL/L (ref 8–16)
AST SERPL-CCNC: 27 U/L (ref 10–40)
BILIRUB SERPL-MCNC: 0.5 MG/DL (ref 0.1–1)
BUN SERPL-MCNC: 11 MG/DL (ref 8–23)
CALCIUM SERPL-MCNC: 9.7 MG/DL (ref 8.7–10.5)
CHLORIDE SERPL-SCNC: 105 MMOL/L (ref 95–110)
CO2 SERPL-SCNC: 25 MMOL/L (ref 23–29)
CREAT SERPL-MCNC: 1 MG/DL (ref 0.5–1.4)
EST. GFR  (NO RACE VARIABLE): >60 ML/MIN/1.73 M^2
ESTIMATED AVG GLUCOSE: 157 MG/DL (ref 68–131)
GLUCOSE SERPL-MCNC: 234 MG/DL (ref 70–110)
HBA1C MFR BLD: 7.1 % (ref 4–5.6)
POTASSIUM SERPL-SCNC: 4.6 MMOL/L (ref 3.5–5.1)
PROT SERPL-MCNC: 7.8 G/DL (ref 6–8.4)
SODIUM SERPL-SCNC: 137 MMOL/L (ref 136–145)

## 2022-11-08 PROCEDURE — 83036 HEMOGLOBIN GLYCOSYLATED A1C: CPT | Performed by: INTERNAL MEDICINE

## 2022-11-08 PROCEDURE — 80053 COMPREHEN METABOLIC PANEL: CPT | Performed by: INTERNAL MEDICINE

## 2022-11-08 PROCEDURE — 36415 COLL VENOUS BLD VENIPUNCTURE: CPT | Performed by: INTERNAL MEDICINE

## 2022-12-09 DIAGNOSIS — M70.72 HIP BURSITIS, LEFT: Primary | ICD-10-CM

## 2022-12-13 ENCOUNTER — CLINICAL SUPPORT (OUTPATIENT)
Dept: REHABILITATION | Facility: HOSPITAL | Age: 67
End: 2022-12-13
Payer: MEDICARE

## 2022-12-13 DIAGNOSIS — M25.652 DECREASED RANGE OF LEFT HIP MOVEMENT: ICD-10-CM

## 2022-12-13 DIAGNOSIS — M54.16 LUMBAR RADICULOPATHY: Primary | ICD-10-CM

## 2022-12-13 DIAGNOSIS — M25.552 LEFT HIP PAIN: ICD-10-CM

## 2022-12-13 PROCEDURE — 97110 THERAPEUTIC EXERCISES: CPT | Mod: PN

## 2022-12-13 PROCEDURE — 97162 PT EVAL MOD COMPLEX 30 MIN: CPT | Mod: PN

## 2022-12-13 NOTE — PLAN OF CARE
[facility]  Physical Therapy Initial Evaluation    Date: 12/13/2022   Name: Rancho Contreras  Clinic Number: [id number]    Therapy Diagnosis:   Encounter Diagnoses   Name Primary?    Lumbar radiculopathy Yes    Left hip pain     Decreased range of left hip movement      Physician: Chidi Rodgers MD    Physician Orders: PT Eval and Treat   Medical Diagnosis from Referral: M70.72 (ICD-10-CM) - Hip bursitis, left  Evaluation Date: 12/13/2022  Authorization Period Expiration: 1/4/2023  Plan of Care Expiration: 2/10/2022  Visit # / Visits authorized: 1/1    Time In: 5:05 pm  Time Out: 6:00 pm  Total Appointment Time (timed & untimed codes): 55 minutes (1 MCE) (1 TE)    Precautions: Standard    Subjective   Date of onset: ~ 1 year ago   History of current condition - Rancho reports: he started to have left hip and low back pain about a year ago, which he attributes to arthritis. Patient reports prior lumbar surgery, but cannot recall what they did. Patient reports having numbness/tingling down left lower extremity to his ankle. Aggravating factors include waking up in the morning and sleeping. Easing factors include ibuprofen 800. Patient denies any saddle paresthesia, recent balance deficits, or bowel/bladder incontinence. Patient is a poor historian and cannot attribute any aggravating factors aside from sitting and waking up in the morning.     Recent falls: 0     Imaging: none available      Medical History:   Past Medical History:   Diagnosis Date    Diabetes mellitus type II     HEARING LOSS     Hyperlipidemia     Hypertension        Surgical History:   Rancho Contreras  has a past surgical history that includes Tonsillectomy and Adenoidectomy.    Medications:   Rancho has a current medication list which includes the following prescription(s): amlodipine, atorvastatin, clobetasol 0.05%, ibuprofen, lantus solostar u-100 insulin, losartan, metformin, tadalafil, true metrix glucose  meter, and trueplus pen needle.    Allergies:   Review of patient's allergies indicates:   Allergen Reactions    Shellfish containing products Swelling        Prior Therapy: yes, for his low back   Social History: lives with family   Occupation: retired   Prior Level of Function: 100%  Current Level of Function: 100%    Pain:  Current: 6/10; Worst: 10/10; Best: 0/10 - when he takes 2 ibuprofen   Location: low back; left hip   Description: left lower extremity numbness; sharp at times  Aggravating Factors: see above   Easing Factors: see above     Pt's goals:   Patient would like to improve the pain of his hip and knee.   Objective   Observation: slumped sitting posture; poor historian     DTR:   Right Left   Patellar (L3-4) 1+ 1+   Achilles (S1) 1+ 1+       Lumbar Range of Motion:    Degrees Pain   Flexion  WNL    Low back and left lower extremity pain   Casco's sign ( - )   Extension  WNL    Low back pain   Left Side Bending  WNL  Left knee pain   Right Side Bending  WNL  No pain   Left rotation    WNL  No pain   Right Rotation    WNL  No pain        Hip Range of Motion:  Pain = *   Right Passive Left Passive   External Rotation  28 degrees   25 degrees *   Internal Rotation  35 degrees  25 degrees   Flexion  115 degrees  110 degrees       Lower Extremity Strength  Right LE  Left LE    Knee extension:  26.8 kg Knee extension:  22.2 kg    Knee flexion:  18.0 kg  Knee flexion:  20.2 kg   Hip flexion:  14.0 kg  Hip flexion:  8.9 kg   Hip extension:   4/5 Hip extension:  4/5   Hip abduction:  10.6 kg Hip abduction:  7.9 kg       Special Tests:  - Seated slump: (+) - left     Sensation: WNL    Flexibility: hip external rotation deficits      Limitation/Restriction for FOTO Lumbar Survey    Therapist reviewed FOTO scores for Rancho Contreras on 12/13/2022.   FOTO documents entered into [facility] [employer] - see Media section.    Limitation Score: 64%  Predicted Limitation Score: 45%         TREATMENT   Treatment Time In: 5:35  "pm  Treatment Time Out: 5:50 pm  Total Treatment time (time-based codes) separate from Evaluation: 15 minutes    Rancho received therapeutic exercises to develop strength, endurance, ROM, flexibility, posture, and core stabilization for 15 minutes including:  LTR: 20x   Piriformis stretch: 3x30" - left     Home Exercises and Patient Education Provided    Education provided:   - HEP  - Prognosis/POC  - Pain science    Written Home Exercises Provided: yes.  Exercises were reviewed and Rancho was able to demonstrate them prior to the end of the session.  Rancho demonstrated good  understanding of the education provided.     See EMR under Patient Instructions for exercises provided 12/13/2022.    Assessment   Rancho is a 67 y.o. male referred to outpatient Physical Therapy with a medical diagnosis of left hip bursitis. Patient is a poor historian in terms of subjective report and provocative factors. Patient presents with signs and symptoms of left hip bursitis/gluteal tendinopathy with lumbar involvement and history of harvest of left anterior superior iliac crest autograph through a separate incision for cervical decompression and fusion. Primary deficits include lack of left hip external rotation, pain with passive left hip external rotation, and gluteal strength deficits. Patient would benefit from skilled PT focusing on these impairments.     Pt to be seen 1x/week for 8 weeks     Pt prognosis is Good.   Pt will benefit from skilled outpatient Physical Therapy to address the deficits stated above and in the chart below, provide pt/family education, and to maximize pt's level of independence.     Plan of care discussed with patient: Yes  Pt's spiritual, cultural and educational needs considered and patient is agreeable to the plan of care and goals as stated below:     Anticipated Barriers for therapy: patient only available at 4pm; poor historian     Medical Necessity is demonstrated by the " following  History  Co-morbidities and personal factors that may impact the plan of care Co-morbidities:   diabetes, high BMI, HTN, level of undertstanding of current condition, and prior lumbar surgery    Personal Factors:   lifestyle     high   Examination  Body Structures and Functions, activity limitations and participation restrictions that may impact the plan of care Body Regions:   back  lower extremities  trunk    Body Systems:    gross symmetry  ROM  strength  gross coordinated movement  balance  gait  transfers  transitions  motor control  motor learning    Participation Restrictions:   Community ambulation   Shopping   Cleaning   Chores    Activity limitations:   Learning and applying knowledge  no deficits    General Tasks and Commands  no deficits    Communication  no deficits    Mobility  lifting and carrying objects  walking    Self care  no deficits    Domestic Life  shopping  doing house work (cleaning house, washing dishes, laundry)  assisting others    Interactions/Relationships  no deficits    Life Areas  no deficits    Community and Social Life  no deficits         high   Clinical Presentation evolving clinical presentation with changing clinical characteristics moderate   Decision Making/ Complexity Score: moderate     Goals:  Short Term Goals: 4 weeks  1. Patient will be independent with HEP in order to supplement pain free lumbar ROM - PROGRESSING, NOT MET  2. Pt will improve hip flexibility to WNL to promote functional mobility - PROGRESSING, NOT MET  3. Patient will display the ability to perform an isometric transverse abdominis isometric contraction in supine for improved trunk stability - PROGRESSING, NOT MET      Long Term Goals: 8 weeks   1. Pt will improve lumbar FOTO survey to </= 45% limited in order to return to ADLs without limitation - PROGRESSING, NOT MET  2. Patient will improve hip flexion, abduction, and extension strength to a 5/5 bilaterally for improved trunk support. -  PROGRESSING, NOT MET  3. Pt will report no pain during lumbar AROM in order to promote functional mobility - PROGRESSING, NOT MET  4. Patient will display the ability and understanding of performing a proper hip hinge with quality movement coordination for lifting mechanics. - PROGRESSING, NOT MET     Plan   Plan of care Certification: 12/13/2022 to 2/10/2022.    Outpatient Physical Therapy 1 times weekly for 8 weeks to include the following interventions: Cervical/Lumbar Traction, Gait Training, Manual Therapy, Moist Heat/ Ice, Neuromuscular Re-ed, Patient Education, Self Care, Therapeutic Activities, and Therapeutic Exercise.     Remberto Paez, PT

## 2022-12-19 ENCOUNTER — CLINICAL SUPPORT (OUTPATIENT)
Dept: REHABILITATION | Facility: HOSPITAL | Age: 67
End: 2022-12-19
Payer: MEDICARE

## 2022-12-19 DIAGNOSIS — M54.16 LUMBAR RADICULOPATHY: ICD-10-CM

## 2022-12-19 DIAGNOSIS — M25.552 LEFT HIP PAIN: Primary | ICD-10-CM

## 2022-12-19 DIAGNOSIS — M25.652 DECREASED RANGE OF LEFT HIP MOVEMENT: ICD-10-CM

## 2022-12-19 PROCEDURE — 97110 THERAPEUTIC EXERCISES: CPT | Mod: PN

## 2022-12-19 NOTE — PROGRESS NOTES
"  [facility]   Physical Therapy Treatment Note     Name: Rancho Contreras  Clinic Number: [id number]    Therapy Diagnosis:   Encounter Diagnoses   Name Primary?    Left hip pain Yes    Lumbar radiculopathy     Decreased range of left hip movement      Physician: Chidi Rodgers MD    Visit Date: 12/19/2022       Physician Orders: PT Eval and Treat   Medical Diagnosis from Referral: M70.72 (ICD-10-CM) - Hip bursitis, left  Evaluation Date: 12/13/2022  Authorization Period Expiration: 1/4/2023  Plan of Care Expiration: 2/10/2022  Visit # / Visits authorized: 1/20 (+eval)     Time In: 4:15 pm  Time Out: 5:00 pm  Total Appointment Time (timed & untimed codes): 30 minutes (2 TE) - 1 on 1 x 30 minutes      Precautions: Standard    SUBJECTIVE     Pt reports: his hip is feeling about the same as the initial evaluation. Patient reports compliance with 2 exercises provided. Rating pain as a 6/10 today.  He was compliant with home exercise program.  Response to previous treatment: no change   Functional change: no change     Pain: 6/10  Location: left hip     OBJECTIVE     Objective Measures updated at progress report unless specified.     Treatment     Rancho received the treatments listed below:    manual therapy techniques: Joint mobilizations were applied to the: left hip for 15 minutes, including:  Lateral left hip distraction  Long axis left hip distraction - grade III-V    therapeutic exercises to develop strength, endurance, ROM, and flexibility for 30 minutes including:  LTR: 20x  Piriformis stretch: 3x30"  Modified figure 4 piriformis stretch: 3x30" - foam roll assist   Straight leg raises: 3x10  Right sidelying clamshells: 3x10  Right sidelying reverse clamshells: 3x10  Bridges: 3x10     NEXT:   Cybex hamstring curls      Patient Education and Home Exercises     Home Exercises Provided and Patient Education Provided     Education provided:   - home exercise program   - POC/Prognosis "     Written Home Exercises Provided: yes. Exercises were reviewed and Rancho was able to demonstrate them prior to the end of the session.  Rancho demonstrated good  understanding of the education provided. See EMR under Patient Instructions for exercises provided during therapy sessions    ASSESSMENT   Rancho is a 67 y.o. male referred to outpatient Physical Therapy with a medical diagnosis of left hip bursitis. Patient is a poor historian in terms of subjective report and provocative factors. Patient presents with signs and symptoms of left hip bursitis/gluteal tendinopathy with lumbar involvement and history of harvest of left anterior superior iliac crest autograph through a separate incision for cervical decompression and fusion. First visit following initial evaluation consisted of left hip joint mobility and flexibility followed by hip rotational strengthening.     Rancho Is progressing well towards his goals.   Pt prognosis is Good.     Pt will continue to benefit from skilled outpatient physical therapy to address the deficits listed in the problem list box on initial evaluation, provide pt/family education and to maximize pt's level of independence in the home and community environment.     Pt's spiritual, cultural and educational needs considered and pt agreeable to plan of care and goals.     Anticipated barriers to physical therapy: poor historian     Goals:   Short Term Goals: 4   1. Patient will be independent with HEP in order to supplement pain free lumbar ROM - PROGRESSING, NOT MET  2. Pt will improve hip flexibility to WNL to promote functional mobility - PROGRESSING, NOT MET  3. Patient will display the ability to perform an isometric transverse abdominis isometric contraction in supine for improved trunk stability - PROGRESSING, NOT MET      Long Term Goals: 8 weeks   1. Pt will improve lumbar FOTO survey to </= 45% limited in order to return to ADLs without limitation - PROGRESSING, NOT MET  2.  Patient will improve hip flexion, abduction, and extension strength to a 5/5 bilaterally for improved trunk support. - PROGRESSING, NOT MET  3. Pt will report no pain during lumbar AROM in order to promote functional mobility - PROGRESSING, NOT MET  4. Patient will display the ability and understanding of performing a proper hip hinge with quality movement coordination for lifting mechanics. - PROGRESSING, NOT MET     PLAN   Left hip joint mobility  Left hip flexibility   Gluteus and hip strengthening     Remberto Paez, PT

## 2023-01-03 ENCOUNTER — TELEPHONE (OUTPATIENT)
Dept: REHABILITATION | Facility: HOSPITAL | Age: 68
End: 2023-01-03
Payer: MEDICARE

## 2023-02-27 PROBLEM — M25.652 DECREASED RANGE OF LEFT HIP MOVEMENT: Status: RESOLVED | Noted: 2022-12-13 | Resolved: 2023-02-27

## 2023-02-27 PROBLEM — M54.16 LUMBAR RADICULOPATHY: Status: RESOLVED | Noted: 2022-07-25 | Resolved: 2023-02-27

## 2023-02-27 PROBLEM — M25.552 LEFT HIP PAIN: Status: RESOLVED | Noted: 2022-12-13 | Resolved: 2023-02-27

## 2023-04-20 ENCOUNTER — OFFICE VISIT (OUTPATIENT)
Dept: SLEEP MEDICINE | Facility: CLINIC | Age: 68
End: 2023-04-20
Payer: MEDICARE

## 2023-04-20 DIAGNOSIS — G47.30 SLEEP APNEA, UNSPECIFIED TYPE: Primary | ICD-10-CM

## 2023-04-20 PROCEDURE — 4010F PR ACE/ARB THEARPY RXD/TAKEN: ICD-10-PCS | Mod: CPTII,95,, | Performed by: PSYCHIATRY & NEUROLOGY

## 2023-04-20 PROCEDURE — 99204 OFFICE O/P NEW MOD 45 MIN: CPT | Mod: 95,,, | Performed by: PSYCHIATRY & NEUROLOGY

## 2023-04-20 PROCEDURE — 4010F ACE/ARB THERAPY RXD/TAKEN: CPT | Mod: CPTII,95,, | Performed by: PSYCHIATRY & NEUROLOGY

## 2023-04-20 PROCEDURE — 99204 PR OFFICE/OUTPT VISIT, NEW, LEVL IV, 45-59 MIN: ICD-10-PCS | Mod: 95,,, | Performed by: PSYCHIATRY & NEUROLOGY

## 2023-04-20 NOTE — PATIENT INSTRUCTIONS
SLEEP LAB ([facility]) will contact you to schedulethe sleep study. Their number is [phone] (ext 2). Please call them if you do not hear from them in 2 weeks from now.  [facility] is located on 7th floor of the [facility] (for home and in lab studies); [place] [facility] is located in [facility] ( in lab studies only).    SLEEP CLINIC (my assistant) will call you when the sleep study results are ready or I will message you through the portal with the results as we have discussed - if you have not heard from us by 2 weeks from the date of the study, or you can use [facility] to contact me.    Our clinic phone number is [phone] (ext 1)       You are advised to abstain from driving should you feel sleepy or drowsy.

## 2023-05-09 ENCOUNTER — LAB VISIT (OUTPATIENT)
Dept: LAB | Facility: HOSPITAL | Age: 68
End: 2023-05-09
Attending: INTERNAL MEDICINE
Payer: MEDICARE

## 2023-05-09 DIAGNOSIS — Z79.4 TYPE 2 DIABETES MELLITUS WITHOUT COMPLICATION, WITH LONG-TERM CURRENT USE OF INSULIN: ICD-10-CM

## 2023-05-09 DIAGNOSIS — E11.9 TYPE 2 DIABETES MELLITUS WITHOUT COMPLICATION, WITH LONG-TERM CURRENT USE OF INSULIN: ICD-10-CM

## 2023-05-09 LAB
CHOLEST SERPL-MCNC: 157 MG/DL (ref 120–199)
CHOLEST/HDLC SERPL: 2.9 {RATIO} (ref 2–5)
ESTIMATED AVG GLUCOSE: 171 MG/DL (ref 68–131)
HBA1C MFR BLD: 7.6 % (ref 4–5.6)
HDLC SERPL-MCNC: 54 MG/DL (ref 40–75)
HDLC SERPL: 34.4 % (ref 20–50)
LDLC SERPL CALC-MCNC: 71.2 MG/DL (ref 63–159)
NONHDLC SERPL-MCNC: 103 MG/DL
TRIGL SERPL-MCNC: 159 MG/DL (ref 30–150)

## 2023-05-09 PROCEDURE — 83036 HEMOGLOBIN GLYCOSYLATED A1C: CPT | Performed by: INTERNAL MEDICINE

## 2023-05-09 PROCEDURE — 80061 LIPID PANEL: CPT | Performed by: INTERNAL MEDICINE

## 2023-05-09 PROCEDURE — 36415 COLL VENOUS BLD VENIPUNCTURE: CPT | Performed by: INTERNAL MEDICINE

## 2023-08-01 ENCOUNTER — HOSPITAL ENCOUNTER (OUTPATIENT)
Dept: SLEEP MEDICINE | Facility: HOSPITAL | Age: 68
Discharge: HOME | End: 2023-08-01
Attending: INTERNAL MEDICINE | Admitting: INTERNAL MEDICINE
Payer: MEDICARE

## 2023-08-01 ENCOUNTER — TELEPHONE (OUTPATIENT)
Dept: SLEEP MEDICINE | Facility: OTHER | Age: 68
End: 2023-08-01
Payer: MEDICARE

## 2023-08-01 DIAGNOSIS — G47.33 OSA (OBSTRUCTIVE SLEEP APNEA): Primary | ICD-10-CM

## 2023-08-01 PROCEDURE — 95811 POLYSOM 6/>YRS CPAP 4/> PARM: CPT

## 2023-08-02 NOTE — PROGRESS NOTES
"Education was done via explanation of sleep study process and procedure. All questions were answered.    Pt did meet criteria for CPAP. Pt tolerated CPAP well. CPAP explored 5 to 13. Supine REM sleep was obtained.    Low sat of 85% was observed in study. EKG revealed NSR. Soft to moderate snoring was heard during diagnostic portion of study. Medium Eson Nasal Mask was used during CPAP titration. Pt response to titration in a.m  "terrible"         Thank you letter was given in a.m  "

## 2023-08-13 ENCOUNTER — PATIENT MESSAGE (OUTPATIENT)
Dept: SLEEP MEDICINE | Facility: CLINIC | Age: 68
End: 2023-08-13

## 2023-08-13 DIAGNOSIS — G47.33 OSA (OBSTRUCTIVE SLEEP APNEA): Primary | ICD-10-CM

## 2023-08-13 PROCEDURE — 95811 POLYSOM 6/>YRS CPAP 4/> PARM: CPT | Mod: 26,52,, | Performed by: PSYCHIATRY & NEUROLOGY

## 2023-08-13 PROCEDURE — 95811 PR POLYSOMNOGRAPHY W/CPAP: ICD-10-PCS | Mod: 26,52,, | Performed by: PSYCHIATRY & NEUROLOGY

## 2023-08-14 NOTE — PROCEDURES
"    Split-Night Report  [facility] - [address], [place] [address]  Phone: [phone]  Fax: [phone]           Patient Name: CHERELLE MURCIA Study Date: 8/1/2023   YOB: 1955 Patient MRN: [id number]   Age:  68 year     Sex: Male Referring Physician: ALFONSO CAMPBELL M.D   Height: 5' 4" Recording Tech: Tina Chen RRT RPSGT   Weight: 181.0 lbs Scoring Tech: Shyam Islas RRT RPSGT   BMI:  31.3 AASM  1B   Diagnostic AHI: 102 Interpreting Physician Fanny Campbell MD   RERA index: 18.0 Diagnostic Low O2 sat. 81.0%   Diagnostic RDI: 120.0 Location [facility]     Polysomnogram Data: A full night polysomnogram was performed recording the standard physiologic parameters including EEG, EOG, EMG, EKG, nasal and oral airflow.  Respiratory parameters of chest and abdominal movements are recorded with Peizo-Crystal motion transducers.  Oxygen saturation was recorded by pulse oximetry.    Sleep architecture: This was a split night study. During the diagnostic portion of the study, the patient fell asleep in 0.0 minutes. Sleep efficiency was 100.0%. Total sleep time (TST) during the diagnostic portion was 66.5 minutes. REM latency was - minutes. Sleep architecture in the diagnostic part was significantly disrupted due to underlying sleep apnea.    Respiratory: Mild to moderate snoring was present. During the diagnostic portion of the study, the polysomnogram revealed a presence of 3 obstructive, - central, and - mixed apneas resulting in an Apnea index of 2.7 events per hour.  There were 110 hypopneas resulting in a Hypopnea index of 99.2 events per hour.  The combined Apnea/Hypopnea index was 102.0 events per hour.  There were a total of 20 RERA events resulting in a Respiratory Disturbance Index (RDI) of 120.0 events per hour.  Lowest oxygen saturation was 81.0% and time spent ?88% oxygen saturation was - minutes (-).    Motor movement / Parasomnia: There were no significant " limb movements of sleep noted. The total limb movement index was - (- with arousal).     Cardiac: Cardiac rhythm monitoring revealed a sinus rhythm.    During the diagnostic portion of the study, the average pulse rate was 76.7 bpm.  The minimum pulse rate was 68.0 bpm while the maximum pulse rate was 87.0 bpm. During the treatment portion of the study, the average pulse rate was 75.1 bpm.  The minimum pulse rate was 65.0 bpm while the maximum pulse rate was 87.0 bpm.     CPAP  titration: The patient qualified for split night. During the treatment portion of the study, CPAP  (continuous positive airway pressure) was from 5* cm/H20 up to 13* cm of water using a  Eson F&P nasal  Medium  mask, chin strap, CFlex at 3, and heated humidification. Initial improvement was observed on 5 cm H2O controlling respiratory events in lateral sleep. Effective control of sleep disordered breathing  was seen during supine REM sleep on 12 cm H2O. Final AHI at this pressure was below 5/hour.    Higher pressures were tolerated.       Patient perception: Following the study, the patient indicated regarding CPAP, terrible.       IMPRESSION:  Obstructive Sleep Apnea (G47.33), very severe based on AHI criteria. The patient qualified for a split night study.   Effective control of sleep disordered breathing was achieved with CPAP  at 12 cm of water.    RECOMMENDATION:     CPAP  at 12 cm, mask of patient's choice, chin strap, and heated humidification, which is essential in conjunction with PAP to prevent airway drying and irritation.  Alternativerly, Consider auto-titrating CPAP at 5-12  cm H2O, mask of patient's choice, chin strap, and heated humidification. The patient will need close follow up for symptomatic improvement and APAP download to ensure optimal treatment.                [facility] [facility]    Split-Night Report    Patient Name: CHERELLE MURCIA Study Date: 8/1/2023   YOB: 1955 MRN #: [id number]   Age: 68  "year [facility] #: [id number]   Sex: Male Referring Provider: ALFONSO CAMPBELL M.D   Height: 5' 4" Recording Tech: Tina Chen RRT RPSGT   Weight: 181.0 lbs Scoring Tech: Shyam Islas RRT RPSGT   BMI: 31.3 Interpreting Physician: Fanny Campbell MD   ESS: - Neck Circumference: -   Study Overview    DIAGNOSTIC TREATMENT   Lights Off: 09:39:11 PM Lights Off: 01:06:41 AM   Lights On: 12:39:11 AM Lights On: 05:21:41 AM   Time in Bed: 66.5 min. Time in Bed: 201.5 min.   Total Sleep Time: 66.5 min. Total Sleep Time: 201.5 min.   Sleep Efficiency: 100.0% Sleep Efficiency: 100.0%   Sleep Latency: 0.0 min. Sleep Latency: 0.0 min.   REM Latency from Sleep Onset: - min. REM Latency from Sleep Onset: 6.0 min.   Wake After Sleep Onset: - min. Wake After Sleep Onset: - min.     DIAGNOSTIC TREATMENT    Count Index  Count Index   Awakenings: - 0.0 Awakenings: - 0.0   Arousals: 124 111.9 Arousals: 157 46.7   Apneas & Hypopneas: 113 102.0 Apneas & Hypopneas: 29 8.6    Limb Movements: - - Limb Movements: 48 14.3   Snores: - - Snores: - -   Desaturations: 66 59.5 Desaturations: 30 8.9   Minimum SpO2 TST: 81.0% Minimum SpO2 TST: 87.0%       Sleep Architecture     DIAGNOSTIC TREATMENT ENTIRE NIGHT   Stages Time (min) % TST Time (min) % TST Time (min) % TST   WAKE 129.5  74.5  204.0    Stage N1 60.5 91.0% 64.5 32.0% 125.0 46.6%   Stage N2 6.0 9.0% 92.5 45.9% 98.5 36.8%   Stage N3 0.0 0.0% 15.5 7.7% 15.5 5.8%   REM 0.0 0.0% 29.0 14.4% 29.0 10.8%       Arousal Summary     DIAGNOSTIC TREATMENT    NREM REM TST Index NREM REM TST Index   Respiratory Arousals 80 - 80 72.2 24 - 24 7.1   PLM Arousals - - - - 35 - 35 10.4   Isolated LM Arousals - - - - 2 - 2 0.6   Spontaneous Arousals 44 - 44 39.7 100 3 103 30.7   Total 124 - 124 111.9 154 3 157 46.7   Respiratory Summary    DIAGNOSTIC By Sleep Stage By Body Position Total    NREM REM Supine Non-Supine    Time (min) 66.5 0.0 45.0 21.5 66.5           Obstructive Apnea 3 - 1 2 3   Mixed Apnea - - - - - "   Central Apnea - - - - -   Total Apneas 3 - 1 2 3   Total Apnea Index 2.7 - 1.3 5.6 2.7           Total Hypopnea 110 - 72 38 110   Total Hypopnea Index 99.2 - 96.0 106.0 99.2           Apnea & Hypopnea 113 - 73 40 113   Apnea & Hypopnea Index 102.0 - 97.3 111.6 102.0           RERAs 20 - 17 3 20   RERA Index 18.0 - 22.7 8.4 18.0           .0 - 120.0 120.0 120.0      TREATMENT By Sleep Stage By Body Position Total    NREM REM Supine Non-Supine    Time (min) 172.5 29.0 155.5 46.0 201.5           Obstructive Apnea 1 - 1 - 1   Mixed Apnea - - - - -   Central Apnea 2 - 2 - 2   Total Apneas 3 - 3 - 3   Total Apnea Index 1.0 - 1.2 - 0.9           Total Hypopnea 26 - 26 - 26   Total Hypopnea Index 9.0 - 10.0 - 7.7           Apnea & Hypopnea 29 - 29 - 29   Apnea & Hypopnea Index 10.1 - 11.2 - 8.6           RERAs 10 - 9 1 10   RERA Index 3.5 - 3.5 1.3 3.0           RDI 13.6 - 14.7 1.3 11.6      Scoring Criteria: Hypopneas scored at Choose an item.% desaturation criteria.    Respiratory Event Durations     DIAGNOSTIC TREATMENT   Apnea NREM REM NREM REM   Average (seconds) 13.6 - 13.9 -   Maximum (seconds) 15.4 - 16.1 -   Hypopnea       Average (seconds) 17.9 - 17.5 -   Maximum (seconds) 32.6 - 26.1 -   Oxygen Saturation Summary     DIAGNOSTIC TREATMENT    Wake NREM REM TST Wake NREM REM TST   Average SpO2 94.9% 94.7% - 94.7% 96.1% 96.3% 95.5% 96.2%   Minimum SpO2 84.0% 81.0% - 81.0% 85.0% 87.0% 92.0% 87.0%   Maximum SpO2 99.0% 100.0% - 100.0%  100.0% 99.0% 98.0% 99.0%     DIAGNOSTIC   Oxygen Saturation Distribution    Range (%) Time in range (min) Time in range (%)   90.0 - 100.0 61.9 93.0%   80.0 - 90.0 4.7 7.0%   70.0 - 80.0 - -   60.0 - 70.0 - -   50.0 - 60.0 - -   0.0 - 50.0 - -   Time Spent ?88% SpO2    Range (%) Time in range (min) Time in range (%)   0.0 - 88.0 1.4 2.2%          Count Index   Desaturations 66 59.5      TREATMENT   Oxygen Saturation Distribution    Range (%) Time in range (min) Time in range (%)    90.0 - 100.0 274.9 99.8%   80.0 - 90.0 0.4 0.2%   70.0 - 80.0 - -   60.0 - 70.0 - -   50.0 - 60.0 - -   0.0 - 50.0 - -   Time Spent ?88% SpO2    Range (%) Time in range (min) Time in range (%)   0.0 - 88.0 0.2 0.1%      Count Index   Desaturations 30 8.9      Limb Movement Summary     DIAGNOSTIC TREATMENT    Count Index Count Index   Isolated Limb Movements - - 3 0.9   Periodic Limb Movements (PLMs) - - 45 13.4   Total Limb Movements - - 48 14.3     Cardiac Summary     DIAGNOSTIC TREATMENT    Wake NREM REM Total Wake NREM REM Total   Average ME (BPM) 81.9 76.7 - 80.1 78.3 74.6 77.9 75.9   Minimum ME (BPM) 67.0 68.0 - 67.0 66.0 65.0 72.0 65.0   Maximum ME (BPM) 96.0 87.0 - 96.0 96.0 87.0 84.0 96.0         Diagnostic EtCO2    Stage Min (mmHg) Average (mmHg) Max (mmHg)   Wake - - -   NREM(1+2+3) - - -   REM - - -       Range (mmHg) Time in range (min) Time in range (%)   20.0 - 40.0 - -   40.0 - 50.0 - -   50.0 - 55.0 - -   55.0 - 100.0 - -   Excluded data <20.0 & >65.0 196.0 100.0%       TcCO2 Summary    DIAGNOSTIC    Stage Min (mmHg) Average (mmHg) Max (mmHg)   Wake - - -   NREM(1+2+3) - - -   REM - - -       Range (mmHg) Time in range (min) Time in range (%)   - - -   - - -   - - -   - - -   - - -       TREATMENT    Stage Min (mmHg) Average (mmHg) Max (mmHg)   Wake - - -   NREM(1+2+3) - - -   REM - - -       Range (mmHg) Time in range (min) Time in range (%)   - - -   - - -   - - -   - - -   - - -       Comments    -      Titration Summary    PAP Device PAP Level O2 Level Time (min) TST (min) NREM (min) REM (min) Wake (min) Sleep Eff% OA# CA# MA# Hyp# AHI RERA RDI Min SpO2 SpO2 ?88% (min) Ar. Index   - Off - 196.0 66.5 66.5 0.0 129.5 33.9% 3 - - 110 102.0 20 120.0 81.0  1.4 111.9   CPAP 5 - 43.5 16.5 6.0 10.5 27.0 37.9% - - - - - - - 92.0  0.0 29.1   CPAP 6 - 6.0 4.0 4.0 0.0 2.0 66.7% - - - 5 75.0 1 90.0 92.0  0.0 90.0   CPAP 7 - 8.5 8.0 8.0 0.0 0.5 94.1% 1 - - 9 75.0 - 75.0 92.0  0.0 97.5   CPAP 8 - 43.0 40.0  40.0 0.0 3.0 93.0% - - - 4 6.0 2 9.0 87.0  0.1 45.0   CPAP 9 - 17.5 13.5 7.5 6.0 4.0 77.1% - - - 1 4.4 - 4.4 88.0  0.0 44.4   CPAP 10 - 31.5 30.5 30.5 0.0 1.0 96.8% - - - 5 9.8 1 11.8 93.0  0.0 35.4   CPAP 11 - 4.5 4.0 4.0 0.0 0.5 88.9% - - - 2 30.0 2 60.0 94.0  0.0 90.0   CPAP 12 - 39.5 34.0 23.0 11.0 5.5 86.1% - - - - - 1 1.8 89.0  0.0 35.3   CPAP 13 - 82.0 51.0 49.5 1.5 31.0 62.2% - 2 - - 2.4 3 5.9 91.0  0.0 54.1

## 2023-08-14 NOTE — PROGRESS NOTES
"    Diagnostic Report  [facility] - [address], [place] [address]  Phone: [phone]  Fax: [phone]           Patient Name: Lit Solis Study Date: 8/2/2023   YOB: 1983 Patient MRN: [id number]   Age:  40 year     Sex: Male Referring Physician: NATASHA Gillis NP   Height: 5' 9" Recording Tech: Kymberly Chase RRT RPSGT   Weight: 199.0 lbs Scoring Tech: Shyam Islas RPSGT   BMI:  29.5 AASM:  1B   AHI: 12.4 Interpreting Physician: Fanny Campbell MD   RERA index: - Low oxygen sat: 85.0%   RDI: 12.4       Polysomnogram Data: A full night polysomnogram recorded the standard physiologic parameters including EEG, EOG, EMG, EKG, nasal and oral airflow.  Respiratory parameters of chest and abdominal movements were recorded with Peizo-Crystal motion transducers.  Oxygen saturation was recorded by pulse oximetry.    Sleep architecture: This is a baseline polysomnogram. At light's out, the patient fell asleep in 11.2 minutes and slept for 90.4% of the time. Total sleep time (TST) was 393.5 minutes. 11.4% of TST was in Stage N1 sleep, 14.7% TST in slow wave sleep, and 23.1% TST in REM sleep. The REM latency was 204.5 minutes. Sleep architecture was significantly disrupted due to underlying sleep apnea.     Respiratory: Mild snoring was present. The polysomnogram revealed a presence of 1 obstructive, - central, and - mixed apneas resulting in a Total Apnea index of 0.2 events per hour.  There were 80 hypopneas resulting in a Total Hypopnea index of 12.2 events per hour.  The combined Apnea/Hypopnea index was 12.4 events per hour.  There were a total of - RERA events resulting in a Respiratory Disturbance Index (RDI) of 12.4 events per hour.  Mean oxygen saturation was 93.7%.  The lowest oxygen saturation during sleep was 85.0%.  Time spent ?88% oxygen saturation was - minutes (-).The patient did not qualify for a split night study due to an insufficient number of events in the first " "half of the study.      Motor movement / Parasomnia: There were no significant  limb movements of sleep noted. The total limb movement index was - (- with arousal).     Cardiac: Cardiac rhythm monitoring revealed a sinus rhythm.    The average pulse rate was 59.4 bpm.  The minimum pulse rate was 47.0 bpm while the maximum pulse rate was 97.0 bpm.      Patient perception: On a post-sleep study questionnaire, the patient indicated that sleep was the same in the lab than compared to home.    IMPRESSION:  Obstructive Sleep Apnea (G47.33),  mild based on AHI criteria.    RECOMMENDATION:    CPAP titration study, if the patient is interested in this treatment modality.  In the interim, the patient should avoid supine position sleep, since sleep disordered breathing was most prevalent in this sleeping position.        I have reviewed the attached data report and the raw data tracings of this study epoch-by-epoch and have determined that the recording quality and scoring of events are sufficient to allow for interpretation and electronically signed by:      Fanny Campbell MD 8/2/2023                              [facility]    Diagnostic PSG Report    Patient Name: Lit Solis Study Date: 8/2/2023   YOB: 1983 MRN #: [id number]   Age: 40 year [facility] #: [id number]   Sex: Male Referring Provider: NATASHA Gillis NP   Height: 5' 9" Recording Tech: Kymberly Yoavnet RRT RPSGT   Weight: 199.0 lbs Scoring Tech: Shyam Islas RRT RPSGT   BMI: 29.5 Interpreting Physician: Fanny Cmapbell MD   ESS: - Neck Circumference: -     Study Overview    Lights Off: 10:12:10 PM  Count Index   Lights On: 05:27:22 AM Awakenings: 25 3.8   Time in Bed: 435.2 min. Arousals: 92 14.0   Total Sleep Time: 393.5 min. Apneas & Hypopneas: 81 12.4    Sleep Efficiency: 90.4% Limb Movements: - -   Sleep Latency: 11.2 min. Snores: - -   Wake After Sleep Onset: 30.5 min. Desaturations: 79 12.0    REM Latency from Sleep Onset: 204.5 " min. Minimum SpO2 TST: 85.0%        Sleep Architecture   % of Time in Bed    Stages Time (mins) % Sleep Time   Wake 42.5    Stage N1 45.0 11.4%   Stage N2 199.5 50.7%   Stage N3 58.0 14.7%   REM 91.0 23.1%         Arousal Summary     NREM REM Sleep Index   Respiratory Arousals 16 10 26 4.0   PLM Arousals - - - -   Isolated Limb Movement Arousals - - - -   Spontaneous Arousals 46 20 66 10.1   Total 62 30 92 14.0       Limb Movement Summary     Count Index   Isolated Limb Movements - -   Periodic Limb Movements (PLMs) - -   Total Limb Movements - -         Respiratory Summary     By Sleep Stage By Body Position Total    NREM REM Supine Non-Supine    Time (min) 302.5 91.0 154.5 239.0 393.5           Obstructive Apnea 1 - 1 - 1   Mixed Apnea - - - - -   Central Apnea - - - - -   Total Apneas 1 - 1 - 1   Total Apnea Index 0.2 - 0.4 - 0.2           Hypopnea 38 42 36 44 80   Hypopnea Index 7.5 27.7 14.0 11.0 12.2           Apnea & Hypopnea 39 42 37 44 81   Apnea & Hypopnea Index 7.7 27.7 14.4 11.0 12.4           RERAs - - - - -   RERA Index - - - - -           RDI 7.7 27.7 14.4 11.0 12.4      Scoring Criteria: Hypopneas scored at Choose an item.% desaturation criteria.    Respiratory Event Durations     Apnea Hypopnea    NREM REM NREM REM   Average (seconds) 13.6 - 15.8 19.5   Maximum (seconds) 13.6 - 23.2 47.6       Oxygen Saturation Summary     Wake NREM REM TST TIB   Average SpO2 94.8% 93.4% 94.2% 93.6% 93.7%   Minimum SpO2 87.0% 85.0% 88.0% 85.0% 85.0%   Maximum SpO2 99.0% 97.0% 97.0% 97.0% 99.0%       Oxygen Saturation Distribution    Range (%) Time in range (min) Time in range (%)   90.0 - 100.0 421.0 97.6%   80.0 - 90.0 10.5 2.4%   70.0 - 80.0 - -   60.0 - 70.0 - -   50.0 - 60.0 - -   0.0 - 50.0 - -   Time Spent ?88% SpO2    Range (%) Time in range (min) Time in range (%)   0.0 - 88.0 5.1 1.2%          Count Index   Desaturations 79 12.0      Cardiac Summary     Wake NREM REM Sleep Total   Average Pulse Rate (BPM)  66.5 58.3 59.6 58.6 59.4   Minimum Pulse Rate (BPM) 49.0 47.0 47.0 47.0 47.0   Maximum Pulse Rate (BPM) 97.0 96.0 78.0 96.0 97.0     Pulse Rate Distribution    Range (bpm) Time in range (min) Time in range (%)   0.0 - 40.0 - -   40.0 - 60.0 319.2 73.9%   60.0 - 80.0 109.3 25.3%   80.0 - 100.0 3.4 0.8%   100.0 - 120.0 - -   120.0 - 140.0 - -   140.0 - 200.0 - -     EtCO2 Summary    Stage Min (mmHg) Average (mmHg) Max (mmHg)   Wake - - -   NREM(1+2+3) - - -   REM - - -     Range (mmHg) Time in range (min) Time in range (%)   - - -   - - -   - - -   - - -   - - -     TcCO2 Summary    Stage Min (mmHg) Average (mmHg) Max (mmHg)   Wake - - -   NREM(1+2+3) - - -   REM - - -     Range (mmHg) Time in range (min) Time in range (%)   20.0 - 40.0 - -   40.0 - 50.0 - -   50.0 - 55.0 - -   55.0 - 100.0 - -   Excluded data <20.0 & >65.0 436.0 100.0%     Comments    -

## 2023-09-15 ENCOUNTER — LAB VISIT (OUTPATIENT)
Dept: LAB | Facility: HOSPITAL | Age: 68
End: 2023-09-15
Attending: INTERNAL MEDICINE
Payer: MEDICARE

## 2023-09-15 DIAGNOSIS — Z12.5 PROSTATE CANCER SCREENING: ICD-10-CM

## 2023-09-15 DIAGNOSIS — E11.59 TYPE 2 DIABETES MELLITUS WITH OTHER CIRCULATORY COMPLICATION, WITH LONG-TERM CURRENT USE OF INSULIN: ICD-10-CM

## 2023-09-15 DIAGNOSIS — I10 ESSENTIAL HYPERTENSION: ICD-10-CM

## 2023-09-15 DIAGNOSIS — Z79.4 TYPE 2 DIABETES MELLITUS WITH OTHER CIRCULATORY COMPLICATION, WITH LONG-TERM CURRENT USE OF INSULIN: ICD-10-CM

## 2023-09-15 PROBLEM — R01.1 HEART MURMUR: Status: ACTIVE | Noted: 2023-09-15

## 2023-09-15 PROBLEM — I35.0 AORTIC STENOSIS: Status: ACTIVE | Noted: 2023-01-03

## 2023-09-15 LAB
ALBUMIN SERPL BCP-MCNC: 4.6 G/DL (ref 3.5–5.2)
ALP SERPL-CCNC: 64 U/L (ref 55–135)
ALT SERPL W/O P-5'-P-CCNC: 26 U/L (ref 10–44)
ANION GAP SERPL CALC-SCNC: 14 MMOL/L (ref 8–16)
AST SERPL-CCNC: 23 U/L (ref 10–40)
BASOPHILS # BLD AUTO: 0.06 K/UL (ref 0–0.2)
BASOPHILS NFR BLD: 0.7 % (ref 0–1.9)
BILIRUB SERPL-MCNC: 0.7 MG/DL (ref 0.1–1)
BUN SERPL-MCNC: 8 MG/DL (ref 8–23)
CALCIUM SERPL-MCNC: 9.8 MG/DL (ref 8.7–10.5)
CHLORIDE SERPL-SCNC: 105 MMOL/L (ref 95–110)
CO2 SERPL-SCNC: 21 MMOL/L (ref 23–29)
COMPLEXED PSA SERPL-MCNC: 4.6 NG/ML (ref 0–4)
CREAT SERPL-MCNC: 1 MG/DL (ref 0.5–1.4)
DIFFERENTIAL METHOD: ABNORMAL
EOSINOPHIL # BLD AUTO: 0.3 K/UL (ref 0–0.5)
EOSINOPHIL NFR BLD: 3.6 % (ref 0–8)
ERYTHROCYTE [DISTWIDTH] IN BLOOD BY AUTOMATED COUNT: 13.5 % (ref 11.5–14.5)
EST. GFR  (NO RACE VARIABLE): >60 ML/MIN/1.73 M^2
GLUCOSE SERPL-MCNC: 100 MG/DL (ref 70–110)
HCT VFR BLD AUTO: 40.7 % (ref 40–54)
HGB BLD-MCNC: 13.7 G/DL (ref 14–18)
IMM GRANULOCYTES # BLD AUTO: 0.04 K/UL (ref 0–0.04)
IMM GRANULOCYTES NFR BLD AUTO: 0.5 % (ref 0–0.5)
LYMPHOCYTES # BLD AUTO: 2.8 K/UL (ref 1–4.8)
LYMPHOCYTES NFR BLD: 33.1 % (ref 18–48)
MCH RBC QN AUTO: 30.8 PG (ref 27–31)
MCHC RBC AUTO-ENTMCNC: 33.7 G/DL (ref 32–36)
MCV RBC AUTO: 92 FL (ref 82–98)
MONOCYTES # BLD AUTO: 0.8 K/UL (ref 0.3–1)
MONOCYTES NFR BLD: 9.7 % (ref 4–15)
NEUTROPHILS # BLD AUTO: 4.4 K/UL (ref 1.8–7.7)
NEUTROPHILS NFR BLD: 52.4 % (ref 38–73)
NRBC BLD-RTO: 0 /100 WBC
PLATELET # BLD AUTO: 259 K/UL (ref 150–450)
PMV BLD AUTO: 10.6 FL (ref 9.2–12.9)
POTASSIUM SERPL-SCNC: 4.8 MMOL/L (ref 3.5–5.1)
PROT SERPL-MCNC: 7.9 G/DL (ref 6–8.4)
RBC # BLD AUTO: 4.45 M/UL (ref 4.6–6.2)
SODIUM SERPL-SCNC: 140 MMOL/L (ref 136–145)
WBC # BLD AUTO: 8.44 K/UL (ref 3.9–12.7)

## 2023-09-15 PROCEDURE — 85025 COMPLETE CBC W/AUTO DIFF WBC: CPT | Performed by: INTERNAL MEDICINE

## 2023-09-15 PROCEDURE — 84153 ASSAY OF PSA TOTAL: CPT | Performed by: INTERNAL MEDICINE

## 2023-09-15 PROCEDURE — 36415 COLL VENOUS BLD VENIPUNCTURE: CPT | Performed by: INTERNAL MEDICINE

## 2023-09-15 PROCEDURE — 80053 COMPREHEN METABOLIC PANEL: CPT | Performed by: INTERNAL MEDICINE

## 2023-09-18 ENCOUNTER — TELEPHONE (OUTPATIENT)
Dept: GASTROENTEROLOGY | Facility: CLINIC | Age: 68
End: 2023-09-18
Payer: MEDICARE

## 2023-09-18 NOTE — TELEPHONE ENCOUNTER
Attempt to schedule gi clinic appf from referral by Dr. Fenton for colonoscopy.  Message placed on voicemail.

## 2023-09-18 NOTE — TELEPHONE ENCOUNTER
----- Message from Tina Wadsworth sent at 9/18/2023 11:40 AM CDT -----  Good morning,    The patient have a referral from primary doctor with a diagnosis of Colon cancer screening. Please assist with scheduling the patient.    Thank You

## 2024-06-19 NOTE — PROGRESS NOTES
[facility] - Urology   Clinic Note    SUBJECTIVE:     No chief complaint on file.      Referral from: Sangita Mas PA-C.    History of Present Illness:  Rancho Contreras is a 69 y.o. male who presents to clinic for evaluation of elevated PSA.      PSA:  Lab Results   Component Value Date    PSA 4.6 (H) 09/15/2023    PSA 3.9 06/29/2022       Previously abnormal PSA: no.   Previous biopsy: no.   Family history of prostate cancer: no.      Patient endorses no additional complaints at this time.    Past Medical History:   Diagnosis Date    Diabetes mellitus type II     HEARING LOSS     Hyperlipidemia     Hypertension        Past Surgical History:   Procedure Laterality Date    ADENOIDECTOMY      TONSILLECTOMY         Family History   Problem Relation Name Age of Onset    Diabetes Father         Social History     Tobacco Use    Smoking status: Light Smoker     Types: Cigars    Smokeless tobacco: Never   Substance Use Topics    Alcohol use: Yes     Alcohol/week: 1.0 - 2.0 standard drink of alcohol     Types: 1 - 2 Standard drinks or equivalent per week     Comment: occasional    Drug use: No       Current Outpatient Medications on File Prior to Visit   Medication Sig Dispense Refill    amLODIPine (NORVASC) 5 MG tablet Take 5 mg by mouth once daily.      atorvastatin (LIPITOR) 40 MG tablet Take 40 mg by mouth once daily.      clobetasol 0.05% (TEMOVATE) 0.05 % Oint APPLY A THIN LAYER TO THE AFFECTED AREA(S) TWICE A DAY      ibuprofen (ADVIL,MOTRIN) 800 MG tablet Take 1 tablet (800 mg total) by mouth 2 (two) times a day. 60 tablet 4    LANTUS SOLOSTAR U-100 INSULIN glargine 100 units/mL (3mL) SubQ pen INJECT 40 UNITS BY SUBCUTANEOUS ROUTE EVERY NIGHT AT BEDTIME      losartan (COZAAR) 100 MG tablet Take 100 mg by mouth once daily.      metFORMIN (GLUCOPHAGE) 500 MG tablet Take 2 tablets (1,000 mg total) by mouth every evening.      tadalafiL (CIALIS) 20 MG Tab Take 1 pill 30 minutes prior to sexual activity.  **Not to  "exceed one pill in 36 hours.** 30 tablet 4    TRUE METRIX GLUCOSE METER Misc USE TO TEST BLOOD SUGAR      TRUEPLUS PEN NEEDLE 32 gauge x 5/32" Ndle USE DAILY       No current facility-administered medications on file prior to visit.       Review of patient's allergies indicates:   Allergen Reactions    Shellfish containing products Swelling       Review of Systems:  A review of 10+ systems was conducted with pertinent positive and negative findings documented in HPI with all other systems reviewed and negative.    OBJECTIVE:     Estimated body mass index is 30.9 kg/m² as calculated from the following:    Height as of 9/15/23: 5' 4" (1.626 m).    Weight as of 9/15/23: 81.6 kg (180 lb).    Vital Signs (Most Recent)  There were no vitals filed for this visit.    Physical Exam:  GENERAL: patient sitting comfortably  HEENT: normocephalic  NECK: supple, no JVD  PULM: normal chest rise, no increased WOB  HEART: non-diaphoretic  ABDO: soft, nondistended, nontender  BACK: no CVA tenderness bilaterally  SKIN: warm, dry, well perfused  EXT: no bruising or edema  NEURO: grossly normal with no focal deficits  PSYCH: appropriate mood and affect    Genitourinary Exam:  EDWIN: appropriate sphincter tone, smooth, non-rubbery, rubbery prostate w/o nodules    Lab Results   Component Value Date    BUN 8 09/15/2023    CREATININE 1.0 09/15/2023    WBC 8.44 09/15/2023    HGB 13.7 (L) 09/15/2023    HCT 40.7 09/15/2023     09/15/2023    AST 23 09/15/2023    ALT 26 09/15/2023    ALKPHOS 64 09/15/2023    ALBUMIN 4.6 09/15/2023    HGBA1C 7.6 (H) 05/09/2023        PSA:  Lab Results   Component Value Date    PSA 4.6 (H) 09/15/2023    PSA 3.9 06/29/2022       Testosterone:  No results found for: "TOTALTESTOST", "TESTOSTERONE"     Imaging:  I have personally reviewed all relevant imaging.    No results found for this or any previous visit (from the past 2160 hour(s)).  No results found for this or any previous visit (from the past 2160 " hour(s)).  No image results found.      ASSESSMENT     1. Elevated PSA        PLAN:     Elevated PSA    - PSA values were discussed. PSA is a protein made by the prostate in both benign and malignant conditions. I discussed with the finding of an abnormal PSA test or rising PSA level. We discussed about the benefits and limitations of PSA testing/screening.    - We discussed the most common differential diagnosis of an elevated PSA including BPH, prostatitis (chronic and acute), and prostate cancer. Other common benign reasons for elevated PSA include infection, recent instrumentation, ejaculation, and trauma.     -  We also discussed next steps, including repeating the PSA, proceeding to prostate needle biopsy, as well as the utility of a multi-parametric prostate MRI.    - After our discussion, we decided to proceed with repeat PSA.    PSA ranges by age and ethnicity:     Age range             -American      -American  40-49               0-2.5                0-2.0                        0-2.0  50-59               0-3.5                0-4.0                        0-3.0  60-69               0-4.5                0-4.5                        0-4.0  70-79               0-6.5                0-5.5                        0-5.0     Ja Best MD  Urology  [facility] [place] [facility]    Disclaimer: This note has been generated using voice-recognition software. There may be typographical errors that have been missed during proof-reading.

## 2024-06-20 ENCOUNTER — OFFICE VISIT (OUTPATIENT)
Dept: UROLOGY | Facility: CLINIC | Age: 69
End: 2024-06-20
Payer: MEDICARE

## 2024-06-20 ENCOUNTER — LAB VISIT (OUTPATIENT)
Dept: LAB | Facility: HOSPITAL | Age: 69
End: 2024-06-20
Attending: UROLOGY
Payer: MEDICARE

## 2024-06-20 VITALS
HEART RATE: 85 BPM | SYSTOLIC BLOOD PRESSURE: 117 MMHG | HEIGHT: 64 IN | WEIGHT: 181.56 LBS | DIASTOLIC BLOOD PRESSURE: 75 MMHG | BODY MASS INDEX: 30.99 KG/M2

## 2024-06-20 DIAGNOSIS — R97.20 ELEVATED PSA: ICD-10-CM

## 2024-06-20 DIAGNOSIS — R97.20 ELEVATED PSA: Primary | ICD-10-CM

## 2024-06-20 LAB
PROSTATE SPECIFIC ANTIGEN, TOTAL: 5.6 NG/ML (ref 0–4)
PSA FREE MFR SERPL: 9.29 %
PSA FREE SERPL-MCNC: 0.52 NG/ML (ref 0–1.5)

## 2024-06-20 PROCEDURE — 1126F AMNT PAIN NOTED NONE PRSNT: CPT | Mod: CPTII,S$GLB,, | Performed by: UROLOGY

## 2024-06-20 PROCEDURE — 4010F ACE/ARB THERAPY RXD/TAKEN: CPT | Mod: CPTII,S$GLB,, | Performed by: UROLOGY

## 2024-06-20 PROCEDURE — 3288F FALL RISK ASSESSMENT DOCD: CPT | Mod: CPTII,S$GLB,, | Performed by: UROLOGY

## 2024-06-20 PROCEDURE — 84153 ASSAY OF PSA TOTAL: CPT | Performed by: UROLOGY

## 2024-06-20 PROCEDURE — 3074F SYST BP LT 130 MM HG: CPT | Mod: CPTII,S$GLB,, | Performed by: UROLOGY

## 2024-06-20 PROCEDURE — 1159F MED LIST DOCD IN RCRD: CPT | Mod: CPTII,S$GLB,, | Performed by: UROLOGY

## 2024-06-20 PROCEDURE — 99204 OFFICE O/P NEW MOD 45 MIN: CPT | Mod: S$GLB,,, | Performed by: UROLOGY

## 2024-06-20 PROCEDURE — 36415 COLL VENOUS BLD VENIPUNCTURE: CPT | Performed by: UROLOGY

## 2024-06-20 PROCEDURE — 3078F DIAST BP <80 MM HG: CPT | Mod: CPTII,S$GLB,, | Performed by: UROLOGY

## 2024-06-20 PROCEDURE — 3008F BODY MASS INDEX DOCD: CPT | Mod: CPTII,S$GLB,, | Performed by: UROLOGY

## 2024-06-20 PROCEDURE — 1101F PT FALLS ASSESS-DOCD LE1/YR: CPT | Mod: CPTII,S$GLB,, | Performed by: UROLOGY

## 2024-06-20 PROCEDURE — 99999 PR PBB SHADOW E&M-EST. PATIENT-LVL III: CPT | Mod: PBBFAC,,, | Performed by: UROLOGY

## 2024-06-20 RX ORDER — METFORMIN HYDROCHLORIDE 1000 MG/1
1 TABLET ORAL DAILY
COMMUNITY
Start: 2024-05-28

## 2024-06-20 RX ORDER — TAMSULOSIN HYDROCHLORIDE 0.4 MG/1
1 CAPSULE ORAL NIGHTLY
COMMUNITY
Start: 2024-06-06 | End: 2024-12-03

## 2024-06-21 DIAGNOSIS — R97.20 ELEVATED PSA: Primary | ICD-10-CM

## 2024-07-16 ENCOUNTER — HOSPITAL ENCOUNTER (OUTPATIENT)
Dept: RADIOLOGY | Facility: HOSPITAL | Age: 69
Discharge: HOME | End: 2024-07-16
Attending: UROLOGY
Payer: MEDICARE

## 2024-07-16 DIAGNOSIS — R97.20 ELEVATED PSA: ICD-10-CM

## 2024-07-16 LAB
CREAT SERPL-MCNC: 0.7 MG/DL (ref 0.5–1.4)
SAMPLE: NORMAL

## 2024-07-16 PROCEDURE — A9585 GADOBUTROL INJECTION: HCPCS | Performed by: UROLOGY

## 2024-07-16 PROCEDURE — 72197 MRI PELVIS W/O & W/DYE: CPT | Mod: 26,,, | Performed by: RADIOLOGY

## 2024-07-16 PROCEDURE — 25500020 PHARM REV CODE 255: Performed by: UROLOGY

## 2024-07-16 PROCEDURE — 72197 MRI PELVIS W/O & W/DYE: CPT | Mod: TC

## 2024-07-16 RX ORDER — GADOBUTROL 604.72 MG/ML
10 INJECTION INTRAVENOUS
Status: COMPLETED | OUTPATIENT
Start: 2024-07-16 | End: 2024-07-16

## 2024-07-16 RX ADMIN — GADOBUTROL 10 ML: 604.72 INJECTION INTRAVENOUS at 01:07

## 2024-07-18 NOTE — PROGRESS NOTES
[place] - Urology   Clinic Note    SUBJECTIVE:     No chief complaint on file.      Referral from: No ref. provider found.    History of Present Illness:  Rancho Contreras is a 69 y.o. male who presents to clinic for evaluation of elevated PSA.      PSA:  Lab Results   Component Value Date    PSA 4.6 (H) 09/15/2023    PSA 3.9 06/29/2022    PSATOTAL 5.6 (H) 06/20/2024    PSAFREE 0.52 06/20/2024       Previously abnormal PSA: no.   Previous biopsy: no.   Family history of prostate cancer: no.      07/18/2024  Here to review imaging    MRI Prostate:  Prostate Volume: 37.2 cc  PSA: 5.6  PSA Density: 0.15  Overall Assessment: PI-RADS 2, 0 targets      Patient endorses no additional complaints at this time.    Past Medical History:   Diagnosis Date    Diabetes mellitus type II     HEARING LOSS     Hyperlipidemia     Hypertension        Past Surgical History:   Procedure Laterality Date    ADENOIDECTOMY      TONSILLECTOMY         Family History   Problem Relation Name Age of Onset    Diabetes Father         Social History     Tobacco Use    Smoking status: Light Smoker     Types: Cigars    Smokeless tobacco: Never   Substance Use Topics    Alcohol use: Yes     Alcohol/week: 1.0 - 2.0 standard drink of alcohol     Types: 1 - 2 Standard drinks or equivalent per week     Comment: occasional    Drug use: No       Current Outpatient Medications on File Prior to Visit   Medication Sig Dispense Refill    amLODIPine (NORVASC) 5 MG tablet Take 5 mg by mouth once daily.      atorvastatin (LIPITOR) 40 MG tablet Take 40 mg by mouth once daily.      clobetasol 0.05% (TEMOVATE) 0.05 % Oint APPLY A THIN LAYER TO THE AFFECTED AREA(S) TWICE A DAY      ibuprofen (ADVIL,MOTRIN) 800 MG tablet Take 1 tablet (800 mg total) by mouth 2 (two) times a day. 60 tablet 4    LANTUS SOLOSTAR U-100 INSULIN glargine 100 units/mL (3mL) SubQ pen INJECT 40 UNITS BY SUBCUTANEOUS ROUTE EVERY NIGHT AT BEDTIME      losartan (COZAAR) 100 MG tablet Take 100 mg  "by mouth once daily.      metFORMIN (GLUCOPHAGE) 1000 MG tablet Take 1 tablet by mouth once daily.      tadalafiL (CIALIS) 20 MG Tab Take 1 pill 30 minutes prior to sexual activity.  **Not to exceed one pill in 36 hours.** 30 tablet 4    tamsulosin (FLOMAX) 0.4 mg Cap Take 1 capsule by mouth every evening.      TRUE METRIX GLUCOSE METER Misc USE TO TEST BLOOD SUGAR      TRUEPLUS PEN NEEDLE 32 gauge x 5/32" Ndle USE DAILY       No current facility-administered medications on file prior to visit.       Review of patient's allergies indicates:   Allergen Reactions    Shellfish containing products Swelling       Review of Systems:  A review of 10+ systems was conducted with pertinent positive and negative findings documented in HPI with all other systems reviewed and negative.    OBJECTIVE:     Estimated body mass index is 31.16 kg/m² as calculated from the following:    Height as of 6/20/24: 5' 4" (1.626 m).    Weight as of 6/20/24: 82.3 kg (181 lb 8.8 oz).    Vital Signs (Most Recent)  There were no vitals filed for this visit.    Physical Exam:  GENERAL: patient sitting comfortably  HEENT: normocephalic  NECK: supple, no JVD  PULM: normal chest rise, no increased WOB  HEART: non-diaphoretic  ABDO: soft, nondistended, nontender  BACK: no CVA tenderness bilaterally  SKIN: warm, dry, well perfused  EXT: no bruising or edema  NEURO: grossly normal with no focal deficits  PSYCH: appropriate mood and affect    Genitourinary Exam:  EDWIN: appropriate sphincter tone, smooth, non-rubbery, rubbery prostate w/o nodules    Lab Results   Component Value Date    BUN 8 09/15/2023    CREATININE 1.0 09/15/2023    WBC 8.44 09/15/2023    HGB 13.7 (L) 09/15/2023    HCT 40.7 09/15/2023     09/15/2023    AST 23 09/15/2023    ALT 26 09/15/2023    ALKPHOS 64 09/15/2023    ALBUMIN 4.6 09/15/2023    HGBA1C 7.6 (H) 05/09/2023        PSA:  Lab Results   Component Value Date    PSA 4.6 (H) 09/15/2023    PSA 3.9 06/29/2022    PSATOTAL 5.6 " "(H) 06/20/2024    PSAFREE 0.52 06/20/2024       Testosterone:  No results found for: "TOTALTESTOST", "TESTOSTERONE"     Imaging:  I have personally reviewed all relevant imaging.    No results found for this or any previous visit (from the past 2160 hour(s)).  Results for orders placed or performed during the hospital encounter of 07/16/24 (from the past 2160 hour(s))   MRI Prostate W W/O Contrast    Impression    1. No focal concerning lesions.  Overall Assessment: PI-RADS 2 - Low (clinically significant cancer is unlikely to be present)    Number of targets created for potential MR/US fusion biopsy    Peripheral zone: 0    Transition zone: 0    Electronically signed by resident: Cristóbal Baum  Date:    07/16/2024  Time:    14:59    Electronically signed by: Abhijit Zamora MD  Date:    07/16/2024  Time:    16:56     MRI Prostate W W/O Contrast  Narrative: EXAMINATION:  MRI PROSTATE W W/O CONTRAST    CLINICAL HISTORY:  Prostate cancer suspected;  Elevated prostate specific antigen (PSA)    Additional history: None provided.    TECHNIQUE:  Multiparametric MRI of the prostate/pelvis performed on a 3T scanner with phase pelvic coil. Multiplanar, multisequence images including high resolution, small field-of-view T2-WI; axial diffusion weighted images with multiple B-values and creation of ADC-maps; and dynamic contrast enhanced T1-weighted images through the prostate were obtained before, during, and after the administration of 10 cc intravenous gadolinium.    COMPARISON:  None.    FINDINGS:  Previous biopsy: None    PSA: 5.6 ng/mL 06/20/2024 (previously 4.6 on 09/15/2023)    Prior therapy: None    Prostate: 4.5 x 4.0 x 4.2 cm corresponding to a computed volume of 37.2 cc.    Peripheral zone: Slight T2 hypointensity at the apex of the left peripheral zone medially, possibly reflecting signal from the seminal vesicles.  No focal concerning lesions.    Transitional zone: No focal concerning lesions.    Neurovascular " bundle: Normal appearance.    Seminal vesicles: Normal appearance.    Adjacent Organ Involvement: No evidence for urinary bladder or rectal invasion.    Lymphadenopathy: None.    Other Findings: Bilateral fat containing inguinal hernias.  Impression: 1. No focal concerning lesions.  Overall Assessment: PI-RADS 2 - Low (clinically significant cancer is unlikely to be present)    Number of targets created for potential MR/US fusion biopsy    Peripheral zone: 0    Transition zone: 0    Electronically signed by resident: Cristóbal Baum  Date:    07/16/2024  Time:    14:59    Electronically signed by: Abhijit Zamora MD  Date:    07/16/2024  Time:    16:56      ASSESSMENT     1. Elevated PSA          PLAN:     Elevated PSA    - PSA screening and prostate cancer were discussed.  Prostate MRI was also discussed and the PI-RADS scoring system was explained in depth.  The patient had a prostate MRI with a PI-RADS 2 lesion.    - We discussed the sensitivity and specificity of both PSA testing and prostate MRI in the diagnosis of prostate cancer. We discussed next steps, including observation, continuing to trend the PSA, and proceeding to a prostate biopsy. We discussed ultrasound guided MR Fusion biopsy ([facility]). We discussed the typical detection rates for clinically significant prostate cancer (as published by the Journal of Urology): 4% (95% CI 2-8) for category 1-2, 17% (95% CI 13-21) for category 3, 46% (95% CI 38-55) for category 4 and 75% (95% CI 73-78) for category 5.    - After our discussion, we decided to continue to trend his PSA with a repeat PSA in 12 months.  I offered a transrectal ultrasound and biopsy and patient declined.  He would like to repeat his PSA in 12 months.    Ja Best MD  Urology  [facility] [place]    Disclaimer: This note has been generated using voice-recognition software. There may be typographical errors that have been missed during proof-reading.

## 2024-07-19 ENCOUNTER — OFFICE VISIT (OUTPATIENT)
Dept: UROLOGY | Facility: CLINIC | Age: 69
End: 2024-07-19
Payer: MEDICARE

## 2024-07-19 VITALS
DIASTOLIC BLOOD PRESSURE: 72 MMHG | HEART RATE: 91 BPM | WEIGHT: 181.44 LBS | BODY MASS INDEX: 30.98 KG/M2 | HEIGHT: 64 IN | SYSTOLIC BLOOD PRESSURE: 108 MMHG

## 2024-07-19 DIAGNOSIS — R97.20 ELEVATED PSA: Primary | ICD-10-CM

## 2024-07-19 PROCEDURE — 99999 PR PBB SHADOW E&M-EST. PATIENT-LVL III: CPT | Mod: PBBFAC,,, | Performed by: UROLOGY

## 2024-07-19 PROCEDURE — 99214 OFFICE O/P EST MOD 30 MIN: CPT | Mod: S$GLB,,, | Performed by: UROLOGY

## 2024-07-19 PROCEDURE — 3078F DIAST BP <80 MM HG: CPT | Mod: CPTII,S$GLB,, | Performed by: UROLOGY

## 2024-07-19 PROCEDURE — 1159F MED LIST DOCD IN RCRD: CPT | Mod: CPTII,S$GLB,, | Performed by: UROLOGY

## 2024-07-19 PROCEDURE — 4010F ACE/ARB THERAPY RXD/TAKEN: CPT | Mod: CPTII,S$GLB,, | Performed by: UROLOGY

## 2024-07-19 PROCEDURE — 1101F PT FALLS ASSESS-DOCD LE1/YR: CPT | Mod: CPTII,S$GLB,, | Performed by: UROLOGY

## 2024-07-19 PROCEDURE — 3288F FALL RISK ASSESSMENT DOCD: CPT | Mod: CPTII,S$GLB,, | Performed by: UROLOGY

## 2024-07-19 PROCEDURE — 3074F SYST BP LT 130 MM HG: CPT | Mod: CPTII,S$GLB,, | Performed by: UROLOGY

## 2024-07-19 PROCEDURE — 1126F AMNT PAIN NOTED NONE PRSNT: CPT | Mod: CPTII,S$GLB,, | Performed by: UROLOGY

## 2024-07-19 PROCEDURE — 3008F BODY MASS INDEX DOCD: CPT | Mod: CPTII,S$GLB,, | Performed by: UROLOGY

## 2024-10-10 NOTE — PROGRESS NOTES
Rancho Contreras is a 67 y.o. male is here to be evaluated for a sleep disorder; referred by Chidi Fenton MD.    The patient reports snoring, gasping for air, choking , excessive daytime sleepiness, and excessive daytime fatigue since over a decade ago.  Used to use CPAP in the past - has not been using for the last 6 years.     Above mentioned symptoms are deteriorating he would like to resume CPAP use.   The patient does not feel rested upon awakening. Takes naps.     The patient  denies morning headaches and reports occasional  dry mouth on awakening.   Rancho Contreras reports occasional  nasal congestion.    Rancho Contreras  denies symptoms concerning for parasomnia except for occasional somniloquy.  The patient  denie duxiliary symptoms of narcolepsy including sleep onset paralysis, hypnagogic hallucinations, sleep attacks and cataplexy.    Rancho Contreras denied symptoms concerning for RLS; nocturnal leg movements have not been noticed.   The patient does not experience sleep related leg cramps.       BT: 12-1  SL: 20 min  Awakenings: 3-4  Wake up time: 6-7 AM    Medications pertinent to sleep  disorders taken currently: -  Previous  medications taken  for sleep disorders:  -    Sleep studies  Over 6 years ago - n/a      Sleep schedule will upload once his sleep questionnaire has been updated.    DME:         PAST MEDICAL HISTORY:    Active Ambulatory Problems     Diagnosis Date Noted    Mixed hearing loss, unspecified 02/14/2013    Essential hypertension 02/14/2013    DM (diabetes mellitus) 02/14/2013    Hyperlipidemia 02/14/2013    Hearing loss, sensorineural 02/18/2013    Decreased ROM of neck 03/19/2019    Decreased strength of upper extremity 03/19/2019    Poor posture 03/19/2019    Impotence of organic origin 06/20/2022    Psoriasis 06/20/2022    Thrombocytopenia 06/20/2022    Vitamin D deficiency 06/20/2022    Lateral epicondylitis 06/20/2022    Obesity 06/20/2022    Tinnitus  06/20/2022    Poorly controlled diabetes mellitus 09/22/2015    Osteoarthritis of spine with radiculopathy, lumbar region 07/25/2022    Noncompliance with medication regimen 11/08/2022     Resolved Ambulatory Problems     Diagnosis Date Noted    Lumbar radiculopathy 07/25/2022    Left hip pain 12/13/2022    Decreased range of left hip movement 12/13/2022     Past Medical History:   Diagnosis Date    Diabetes mellitus type II     HEARING LOSS     Hypertension                 PAST SURGICAL HISTORY:    Past Surgical History:   Procedure Laterality Date    ADENOIDECTOMY      TONSILLECTOMY           FAMILY HISTORY:                Family History   Problem Relation Age of Onset    Diabetes Father        SOCIAL HISTORY:          Tobacco:   Social History     Tobacco Use   Smoking Status Light Smoker    Types: Cigarettes   Smokeless Tobacco Never       alcohol use:    Social History     Substance and Sexual Activity   Alcohol Use Yes    Alcohol/week: 1.0 - 2.0 standard drink    Types: 1 - 2 Standard drinks or equivalent per week    Comment: occasional                   ALLERGIES:    Review of patient's allergies indicates:   Allergen Reactions    Shellfish containing products Swelling       CURRENT MEDICATIONS:    Current Outpatient Medications   Medication Sig Dispense Refill    amLODIPine (NORVASC) 5 MG tablet Take 5 mg by mouth once daily.      atorvastatin (LIPITOR) 40 MG tablet Take 40 mg by mouth once daily.      clobetasol 0.05% (TEMOVATE) 0.05 % Oint APPLY A THIN LAYER TO THE AFFECTED AREA(S) TWICE A DAY      ibuprofen (ADVIL,MOTRIN) 800 MG tablet Take 1 tablet (800 mg total) by mouth 2 (two) times a day. 60 tablet 4    LANTUS SOLOSTAR U-100 INSULIN glargine 100 units/mL (3mL) SubQ pen INJECT 40 UNITS BY SUBCUTANEOUS ROUTE EVERY NIGHT AT BEDTIME      losartan (COZAAR) 100 MG tablet Take 100 mg by mouth once daily.      metFORMIN (GLUCOPHAGE) 500 MG tablet Take 2 tablets (1,000 mg total) by mouth 2 (two) times daily  "with meals. (Patient taking differently: Take 1,000 mg by mouth every evening.) 360 tablet 3    tadalafiL (CIALIS) 20 MG Tab Take 1 pill 30 minutes prior to sexual activity.  **Not to exceed one pill in 36 hours.** 30 tablet 4    TRUE METRIX GLUCOSE METER Misc USE TO TEST BLOOD SUGAR      TRUEPLUS PEN NEEDLE 32 gauge x 5/32" Ndle USE DAILY       No current facility-administered medications for this visit.                  PHYSICAL EXAM:  There were no vitals taken for this visit.            ASSESSMENT:    1. Sleep Apnea NEC. The patient symptomatically has  snoring, gasping for air, choking , witnessed apneas, interrupted sleep, excessive daytime sleepiness, and excessive daytime fatigue  with exam findings of crowded airway. The patient has medical co-morbidities of hypertension  which can be worsened by SYLVAIN. This warrants further investigation for possible obstructive sleep apnea.              PLAN:    Diagnostic: Polysomnogram in lab. The nature of this procedure and its indication was discussed with the patient. We will notify the patient about sleep study resuts by phone.    Planning to order APAP once he requalifies      During our discussion today, we talked about the etiology of  SYLVAIN as well as the potential ramifications of untreated sleep apnea, which could include daytime sleepiness, hypertension, heart disease and/or stroke.  We discussed potential treatment options, which could include weight loss, body positioning, continuous positive airway pressure (CPAP), or referral for surgical consideration. Meanwhile, he  is urged to avoid supine sleep, weight gain and alcoholic beverages since all of these can worsen SYLVAIN.     The patient was given open opportunity to ask questions and/or express concerns about treatment plan. Two point patient identifier confirmed.       Precautions: The patient was advised to abstain from driving should he feel sleepy or drowsy.    Follow up: MD after 1 month of CPAP use. "     46-minute visit. >50% spent counseling patient and coordination of care.  The patient was  cautioned against drowsy driving.            room air

## 2025-06-12 ENCOUNTER — OFFICE VISIT (OUTPATIENT)
Dept: UROLOGY | Facility: CLINIC | Age: 70
End: 2025-06-12
Payer: MEDICARE

## 2025-06-12 ENCOUNTER — TELEPHONE (OUTPATIENT)
Dept: UROLOGY | Facility: CLINIC | Age: 70
End: 2025-06-12

## 2025-06-12 VITALS
HEART RATE: 88 BPM | DIASTOLIC BLOOD PRESSURE: 75 MMHG | SYSTOLIC BLOOD PRESSURE: 115 MMHG | WEIGHT: 180.13 LBS | BODY MASS INDEX: 30.75 KG/M2 | HEIGHT: 64 IN

## 2025-06-12 DIAGNOSIS — R97.20 ELEVATED PSA: Primary | ICD-10-CM

## 2025-06-12 PROCEDURE — 3288F FALL RISK ASSESSMENT DOCD: CPT | Mod: CPTII,S$GLB,,

## 2025-06-12 PROCEDURE — 3074F SYST BP LT 130 MM HG: CPT | Mod: CPTII,S$GLB,,

## 2025-06-12 PROCEDURE — 1159F MED LIST DOCD IN RCRD: CPT | Mod: CPTII,S$GLB,,

## 2025-06-12 PROCEDURE — 3008F BODY MASS INDEX DOCD: CPT | Mod: CPTII,S$GLB,,

## 2025-06-12 PROCEDURE — 1101F PT FALLS ASSESS-DOCD LE1/YR: CPT | Mod: CPTII,S$GLB,,

## 2025-06-12 PROCEDURE — 99213 OFFICE O/P EST LOW 20 MIN: CPT | Mod: S$GLB,,,

## 2025-06-12 PROCEDURE — 99999 PR PBB SHADOW E&M-EST. PATIENT-LVL III: CPT | Mod: PBBFAC,,,

## 2025-06-12 PROCEDURE — 3078F DIAST BP <80 MM HG: CPT | Mod: CPTII,S$GLB,,

## 2025-06-12 PROCEDURE — 1126F AMNT PAIN NOTED NONE PRSNT: CPT | Mod: CPTII,S$GLB,,

## 2025-06-12 PROCEDURE — 1160F RVW MEDS BY RX/DR IN RCRD: CPT | Mod: CPTII,S$GLB,,

## 2025-06-12 PROCEDURE — 4010F ACE/ARB THERAPY RXD/TAKEN: CPT | Mod: CPTII,S$GLB,,

## 2025-06-12 RX ORDER — SEMAGLUTIDE 0.68 MG/ML
0.25 INJECTION, SOLUTION SUBCUTANEOUS
COMMUNITY
Start: 2025-06-03

## 2025-06-12 RX ORDER — INSULIN PUMP SYRINGE, 3 ML
EACH MISCELLANEOUS
COMMUNITY
Start: 2025-06-03 | End: 2026-06-03

## 2025-06-12 RX ORDER — CIPROFLOXACIN 500 MG/1
500 TABLET, FILM COATED ORAL ONCE
Qty: 1 TABLET | Refills: 0 | Status: SHIPPED | OUTPATIENT
Start: 2025-06-12 | End: 2025-06-12

## 2025-06-12 RX ORDER — CEFTRIAXONE 1 G/1
1 INJECTION, POWDER, FOR SOLUTION INTRAMUSCULAR; INTRAVENOUS
Status: SHIPPED | OUTPATIENT
Start: 2025-06-12 | End: 2025-06-13

## 2025-06-12 NOTE — PROGRESS NOTES
Subjective:       Patient ID: Rancho Contreras is a 70 y.o. male.    Chief Complaint: Elevated PSA     This is a 70 y.o.  male patient that is new to me but not new to the system.  Patient was seen by Dr. Best 1 year ago for elevated PSA.  MRI 7/16/24-- showed PI-RADS 2. Dr. Best offered biopsy of prostate due to continued elevated PSA. Patient opted for PSA surveillance.  Here today due to elevated PSA.  6/3/25-- PSA 7.1 from outside lab      LAST PSA  Lab Results   Component Value Date    PSA 4.6 (H) 09/15/2023    PSA 3.9 06/29/2022    PSATOTAL 5.6 (H) 06/20/2024    PSAFREE 0.52 06/20/2024       Lab Results   Component Value Date    CREATININE 1.0 09/15/2023       ---  PMH/PSH/Medications/Allergies/Social history reviewed and as in chart.    Review of Systems   Constitutional:  Negative for activity change, chills and fever.   Respiratory:  Negative for shortness of breath.    Cardiovascular:  Negative for chest pain and palpitations.   Gastrointestinal:  Negative for abdominal pain and constipation.   Genitourinary:  Negative for difficulty urinating, dysuria, flank pain, frequency, hematuria and urgency.   Neurological:  Negative for dizziness and light-headedness.     Objective:      Physical Exam  HENT:      Head: Normocephalic.   Pulmonary:      Effort: Pulmonary effort is normal.   Musculoskeletal:         General: Normal range of motion.      Cervical back: Normal range of motion.   Skin:     General: Skin is warm and dry.   Neurological:      Mental Status: He is alert and oriented to person, place, and time.       Assessment:     Problem Noted   Elevated Psa 6/12/2025       Plan:     Recommended prostate biopsy due to increase in PSA level despite negative MRI.  Patient agrees to do biopsy  Will reach out to nurse Laura to schedule biopsy with Dr. Mcmillan or Dr. Gupta  F/u with Dr. Best to review results      AMADOR Sanford    I spent a total of 25 minutes on the day of the visit.This  includes face to face time and non-face to face time preparing to see the patient (eg, review of tests), obtaining and/or reviewing separately obtained history, documenting clinical information in the electronic or other health record, independently interpreting results and communicating results to the patient/family/caregiver, or care coordinator.

## 2025-06-12 NOTE — TELEPHONE ENCOUNTER
Left message for patient indicating that I sent preop instructions for prostate biopsy to his portal. Left my callback number for any questions.

## 2025-06-12 NOTE — Clinical Note
Hey can we schedule this patient for a prostate biopsy with either Dr. Mcmillan or Dr. Gupta. F/u with Dr. Best after to review. Thanks, Chely

## 2025-06-13 ENCOUNTER — TELEPHONE (OUTPATIENT)
Dept: UROLOGY | Facility: CLINIC | Age: 70
End: 2025-06-13
Payer: MEDICARE

## 2025-06-16 ENCOUNTER — TELEPHONE (OUTPATIENT)
Dept: UROLOGY | Facility: CLINIC | Age: 70
End: 2025-06-16
Payer: MEDICARE

## 2025-06-16 NOTE — TELEPHONE ENCOUNTER
Confirmed appt with patient. Instructions reviewed for enema and cipro, hold off any blood thinners. Parking available on [employer] open parking lot, check in on the 2nd floor of the [facility].

## 2025-06-17 ENCOUNTER — PROCEDURE VISIT (OUTPATIENT)
Dept: UROLOGY | Facility: CLINIC | Age: 70
End: 2025-06-17
Payer: MEDICARE

## 2025-06-17 VITALS
BODY MASS INDEX: 29.55 KG/M2 | SYSTOLIC BLOOD PRESSURE: 134 MMHG | DIASTOLIC BLOOD PRESSURE: 81 MMHG | HEART RATE: 91 BPM | WEIGHT: 172.19 LBS | RESPIRATION RATE: 16 BRPM | TEMPERATURE: 98 F

## 2025-06-17 DIAGNOSIS — R97.20 ELEVATED PSA: Primary | ICD-10-CM

## 2025-06-17 PROCEDURE — 88305 TISSUE EXAM BY PATHOLOGIST: CPT | Mod: TC,91

## 2025-06-17 RX ORDER — LIDOCAINE HYDROCHLORIDE 20 MG/ML
JELLY TOPICAL
Status: COMPLETED | OUTPATIENT
Start: 2025-06-17 | End: 2025-06-17

## 2025-06-17 RX ORDER — LIDOCAINE HYDROCHLORIDE 10 MG/ML
20 INJECTION, SOLUTION INFILTRATION; PERINEURAL
Status: COMPLETED | OUTPATIENT
Start: 2025-06-17 | End: 2025-06-17

## 2025-06-17 RX ORDER — CEFTRIAXONE 1 G/1
1 INJECTION, POWDER, FOR SOLUTION INTRAMUSCULAR; INTRAVENOUS
Status: COMPLETED | OUTPATIENT
Start: 2025-06-17 | End: 2025-06-17

## 2025-06-17 RX ADMIN — LIDOCAINE HYDROCHLORIDE 10 ML: 20 JELLY TOPICAL at 10:06

## 2025-06-17 RX ADMIN — CEFTRIAXONE 1 G: 1 INJECTION, POWDER, FOR SOLUTION INTRAMUSCULAR; INTRAVENOUS at 10:06

## 2025-06-17 RX ADMIN — LIDOCAINE HYDROCHLORIDE 20 ML: 10 INJECTION, SOLUTION INFILTRATION; PERINEURAL at 10:06

## 2025-06-17 NOTE — PATIENT INSTRUCTIONS
What to Expect After a Prostate Biopsy    You may have mild bleeding from the rectum or urine for about 1 week to 1 month, or in your ejaculate for several months. This bleeding is normal and expected, and it will stop. You may have mild discomfort in your rectal or urethral area for 24-48 hours.    You cannot do any strenuous lifting, straining, or exercising for 24 hours. You may return to full activity the day after the biopsy.    You may continue to take all your regular medications after the procedure except for the blood thinners.    You may resume all blood-thinning medications once you no longer see any bleeding or whenever your physician prescribing the medication says it is all right to do so. You may take Tylenol if you have a fever and your temperature is less than 100° F or if you have some discomfort.    You will receive a call from the Urology Department at [facility] with the results of your prostate biopsy within one week.    Signs and Symptoms to Report    Call your [facility] urologist at [phone] if you develop any of the following:  Temperature greater than 101°  F  Inability to urinate  A large amount of bleeding from the rectum or in the urine  Persistent or severe pain    After hours or on weekends, you may reach a urology resident on call at this number: [phone].

## 2025-06-20 LAB
ESTROGEN SERPL-MCNC: NORMAL PG/ML
INSULIN SERPL-ACNC: NORMAL U[IU]/ML
LAB AP GROSS DESCRIPTION: NORMAL
LAB AP PERFORMING LOCATION(S): NORMAL
LAB AP REPORT FOOTNOTES: NORMAL

## 2025-06-23 ENCOUNTER — TELEPHONE (OUTPATIENT)
Dept: UROLOGY | Facility: CLINIC | Age: 70
End: 2025-06-23
Payer: MEDICARE

## 2025-06-24 ENCOUNTER — TELEPHONE (OUTPATIENT)
Dept: UROLOGY | Facility: CLINIC | Age: 70
End: 2025-06-24
Payer: MEDICARE

## 2025-06-25 NOTE — PROGRESS NOTES
Audio Only Telehealth Visit     The patient location is: home  The chief complaint leading to consultation is: elevated psa  Visit type: Virtual visit with audio only (telephone)  Total time spent in medical discussion with patient: 15 minutes  Total time spent on date of the encounter:30 minutes       The reason for the audio only service rather than synchronous audio and video virtual visit was related to technical difficulties or patient preference/necessity.       Each patient to whom I provide medical services by telemedicine is:  (1) informed of the relationship between the physician and patient and the respective role of any other health care provider with respect to management of the patient; and (2) notified that they may decline to receive medical services by telemedicine and may withdraw from such care at any time. Patient verbally consented to receive this service via voice-only telephone call.       HPI: Patient had a h/o elevated PSA, now s/p PNBX. Virtual today to discuss results    Final Diagnosis   1. Prostate, [place], Left (LA), biopsy:       - Benign prostatic tissue       2. Prostate, Mid, Left (LM), biopsy:       - Benign prostatic tissue       3. Prostate, Base, Left (LB), biopsy:       - Prostatic acinar adenocarcinoma      - Patricio grade: 3+3, Group 1      - Two of two cores positive for carcinoma      - The percentage of tissue with carcinoma: 4% and 11%       - The linear amount of tissue with carcinoma: 1 mm and 0.5 mm      4. Prostate, [place], Right (RA), biopsy:       - Benign prostatic tissue with atrophy      5. Prostate, Mid, Right (RM), biopsy:       - Benign prostatic tissue with atrophy      6. Prostate, Base, Right (RB), biopsy:       - Benign prostatic tissue with atrophy         Assessment and plan:      Prostate Cancer: G; 3+3, 2/12 cores positive, PSAD 0.18, low risk    We discussed his prostate cancer in depth, reviewing his diagnosis, stage, grade, risk group, and prognosis. We  discussed the concept of low risk, intermediate risk, and high risk disease. He has low risk disease. We discussed the different treatment options including active surveillance, prostate brachytherapy, external beam radiation therapy, HIFU, and robotic prostatectomy, including the advantages, disadvantages, risks, benefits, and potential complications of each option.     - Regarding surgical therapy, we discussed surgery including surgical technique, postoperative recuperation and recovery, and short and long term complications such as UTI, bleeding, blood clots, catheter dislodgement, anastomotic leakage and urethral stenosis. We discussed the risks of reoperation, incontinence, impotence, and recurrence. We discussed rates of cancer free survival and recurrence, as well as salvage therapeutic options. All questions were answered. No guarantees to the outcome of any treatment option were made.    - After discussion he elected to proceed with AS, fu in 6 mo with PSA.       This service was not originating from a related E/M service provided within the previous 7 days nor will  to an E/M service or procedure within the next 24 hours or my soonest available appointment.  Prevailing standard of care was able to be met in this audio-only visit.

## 2025-06-26 ENCOUNTER — OFFICE VISIT (OUTPATIENT)
Dept: UROLOGY | Facility: CLINIC | Age: 70
End: 2025-06-26
Payer: MEDICARE

## 2025-06-26 DIAGNOSIS — C61 PROSTATE CANCER: Primary | ICD-10-CM

## 2025-07-01 ENCOUNTER — RESULTS FOLLOW-UP (OUTPATIENT)
Dept: UROLOGY | Facility: CLINIC | Age: 70
End: 2025-07-01